# Patient Record
Sex: FEMALE | NOT HISPANIC OR LATINO | Employment: FULL TIME | URBAN - METROPOLITAN AREA
[De-identification: names, ages, dates, MRNs, and addresses within clinical notes are randomized per-mention and may not be internally consistent; named-entity substitution may affect disease eponyms.]

---

## 2024-07-10 ENCOUNTER — OFFICE VISIT (OUTPATIENT)
Dept: URGENT CARE | Facility: URGENT CARE | Age: 39
End: 2024-07-10
Payer: COMMERCIAL

## 2024-07-10 VITALS
SYSTOLIC BLOOD PRESSURE: 134 MMHG | OXYGEN SATURATION: 98 % | WEIGHT: 293 LBS | HEART RATE: 70 BPM | RESPIRATION RATE: 22 BRPM | TEMPERATURE: 98.9 F | DIASTOLIC BLOOD PRESSURE: 85 MMHG

## 2024-07-10 DIAGNOSIS — R60.0 LOWER EXTREMITY EDEMA: Primary | ICD-10-CM

## 2024-07-10 PROCEDURE — 99203 OFFICE O/P NEW LOW 30 MIN: CPT | Performed by: PHYSICIAN ASSISTANT

## 2024-07-10 RX ORDER — TRAZODONE HYDROCHLORIDE 100 MG/1
100-150 TABLET ORAL
COMMUNITY
Start: 2023-08-03 | End: 2024-07-11

## 2024-07-10 RX ORDER — ACETAMINOPHEN AND CODEINE PHOSPHATE 120; 12 MG/5ML; MG/5ML
1 SOLUTION ORAL DAILY
COMMUNITY
Start: 2024-03-15 | End: 2024-07-11

## 2024-07-10 RX ORDER — FUROSEMIDE 20 MG
20 TABLET ORAL DAILY
Qty: 20 TABLET | Refills: 0 | Status: SHIPPED | OUTPATIENT
Start: 2024-07-10 | End: 2024-07-17

## 2024-07-10 RX ORDER — GABAPENTIN 300 MG/1
300 CAPSULE ORAL
COMMUNITY
Start: 2024-03-23 | End: 2024-07-11

## 2024-07-10 RX ORDER — NAPROXEN 500 MG/1
500 TABLET ORAL
COMMUNITY
Start: 2023-03-23 | End: 2024-07-11

## 2024-07-10 RX ORDER — SUMATRIPTAN 25 MG/1
25 TABLET, FILM COATED ORAL
COMMUNITY
Start: 2023-03-23 | End: 2024-07-17

## 2024-07-10 RX ORDER — TRAZODONE HYDROCHLORIDE 150 MG/1
150 TABLET ORAL
COMMUNITY
Start: 2024-03-23 | End: 2024-07-11

## 2024-07-10 RX ORDER — FUROSEMIDE 20 MG
20 TABLET ORAL DAILY
COMMUNITY
Start: 2023-05-18 | End: 2024-07-17

## 2024-07-10 RX ORDER — OLANZAPINE 2.5 MG/1
2.5-5 TABLET, FILM COATED ORAL
COMMUNITY
Start: 2024-03-23 | End: 2024-07-11

## 2024-07-10 RX ORDER — LURASIDONE HYDROCHLORIDE 40 MG/1
40 TABLET, FILM COATED ORAL
COMMUNITY
Start: 2024-03-24 | End: 2024-07-11

## 2024-07-10 RX ORDER — MAGNESIUM OXIDE 400 MG/1
1 TABLET ORAL DAILY
COMMUNITY
Start: 2024-03-23 | End: 2024-07-11

## 2024-07-10 NOTE — PROGRESS NOTES
Assessment & Plan     Lower extremity edema  - furosemide (LASIX) 20 MG tablet; Take 1 tablet (20 mg) by mouth daily Until swelling reduces    Has an appointment scheduled to establish care tomorrow. Discussed that she should keep this appointment. All concerns may not be able to be addressed. Consider restarting birth control to regulate cycle. Reestablish with psychiatry.    Return in about 1 week (around 7/17/2024) for visit with primary care provider if not improving.     Mayte Crowder PA-C  [facility]    Subjective   Racquel Aguilar is a 38 year old who presents for the following health issues     Patient presents with:  Urgent Care  Vaginal Bleeding: Per patient states for the past couple of months she has been bleeding every 2 weeks states she feels tired , and angry   Swelling: Per patient states for the past couple of days her feet have been very swollen and gained 5lbs       HPI    Racquel presents to clinic today stating she has had rectal bleeding, vaginal bleeding, water retention and edema in lower extremities, is having a difficult time controlling her emotions and feels angry often and just feels unwell. She has a history of Bipolar I and cluster B personality disorder. She has not been taking her medications because she feels they're not helping.       Review of Systems   ROS negative except as stated above.      Objective    /85   Pulse 70   Temp 98.9  F (37.2  C) (Tympanic)   Resp 22   Wt 134.3 kg (296 lb)   SpO2 98%   Physical Exam   GENERAL: alert and no distress  EYES: Eyes grossly normal to inspection, PERRL and conjunctivae and sclerae normal  NECK: no adenopathy, no asymmetry, masses, or scars  RESP: lungs clear to auscultation - no rales, rhonchi or wheezes  CV: regular rate and rhythm, normal S1 S2, no S3 or S4, no murmur, click or rub, no peripheral edema  ABDOMEN: soft, nontender, no hepatosplenomegaly, no masses and bowel sounds normal  NEURO:  Normal strength and tone, mentation intact and speech normal  PSYCH: mentation appears normal, affect tearful, normal/bright    No results found for any visits on 07/10/24.

## 2024-07-10 NOTE — PATIENT INSTRUCTIONS
Start lasix for swelling in legs  Follow up with primary care provider tomorrow to address bleeding, health maintenance

## 2024-07-11 ENCOUNTER — OFFICE VISIT (OUTPATIENT)
Dept: FAMILY MEDICINE | Facility: CLINIC | Age: 39
End: 2024-07-11
Payer: COMMERCIAL

## 2024-07-11 ENCOUNTER — TELEPHONE (OUTPATIENT)
Dept: FAMILY MEDICINE | Facility: CLINIC | Age: 39
End: 2024-07-11

## 2024-07-11 VITALS
OXYGEN SATURATION: 98 % | HEIGHT: 65 IN | BODY MASS INDEX: 48.82 KG/M2 | RESPIRATION RATE: 20 BRPM | SYSTOLIC BLOOD PRESSURE: 134 MMHG | WEIGHT: 293 LBS | TEMPERATURE: 97.8 F | DIASTOLIC BLOOD PRESSURE: 84 MMHG | HEART RATE: 62 BPM

## 2024-07-11 DIAGNOSIS — Z13.1 SCREENING FOR DIABETES MELLITUS: ICD-10-CM

## 2024-07-11 DIAGNOSIS — F31.9 BIPOLAR 1 DISORDER (H): Primary | ICD-10-CM

## 2024-07-11 DIAGNOSIS — B96.89 BV (BACTERIAL VAGINOSIS): ICD-10-CM

## 2024-07-11 DIAGNOSIS — G47.33 OSA (OBSTRUCTIVE SLEEP APNEA): ICD-10-CM

## 2024-07-11 DIAGNOSIS — N93.9 ABNORMAL UTERINE BLEEDING: Primary | ICD-10-CM

## 2024-07-11 DIAGNOSIS — Z11.3 SCREEN FOR STD (SEXUALLY TRANSMITTED DISEASE): ICD-10-CM

## 2024-07-11 DIAGNOSIS — Z30.09 ENCOUNTER FOR COUNSELING REGARDING CONTRACEPTION: ICD-10-CM

## 2024-07-11 DIAGNOSIS — F43.10 PTSD (POST-TRAUMATIC STRESS DISORDER): ICD-10-CM

## 2024-07-11 DIAGNOSIS — F41.1 GAD (GENERALIZED ANXIETY DISORDER): ICD-10-CM

## 2024-07-11 DIAGNOSIS — N76.0 BV (BACTERIAL VAGINOSIS): ICD-10-CM

## 2024-07-11 DIAGNOSIS — F31.9 BIPOLAR 1 DISORDER (H): ICD-10-CM

## 2024-07-11 LAB
ALBUMIN SERPL BCG-MCNC: 3.8 G/DL (ref 3.5–5.2)
ALP SERPL-CCNC: 64 U/L (ref 40–150)
ALT SERPL W P-5'-P-CCNC: 25 U/L (ref 0–50)
ANION GAP SERPL CALCULATED.3IONS-SCNC: 6 MMOL/L (ref 7–15)
AST SERPL W P-5'-P-CCNC: 17 U/L (ref 0–45)
BILIRUB SERPL-MCNC: 0.3 MG/DL
BUN SERPL-MCNC: 9.3 MG/DL (ref 6–20)
C TRACH DNA SPEC QL PROBE+SIG AMP: NEGATIVE
CALCIUM SERPL-MCNC: 8.7 MG/DL (ref 8.6–10)
CHLORIDE SERPL-SCNC: 106 MMOL/L (ref 98–107)
CLUE CELLS: PRESENT
CREAT SERPL-MCNC: 0.59 MG/DL (ref 0.51–0.95)
DEPRECATED HCO3 PLAS-SCNC: 28 MMOL/L (ref 22–29)
EGFRCR SERPLBLD CKD-EPI 2021: >90 ML/MIN/1.73M2
ERYTHROCYTE [DISTWIDTH] IN BLOOD BY AUTOMATED COUNT: 13 % (ref 10–15)
FERRITIN SERPL-MCNC: 44 NG/ML (ref 6–175)
GLUCOSE SERPL-MCNC: 93 MG/DL (ref 70–99)
HBA1C MFR BLD: 5.2 % (ref 0–5.6)
HCG UR QL: NEGATIVE
HCT VFR BLD AUTO: 37.5 % (ref 35–47)
HCV AB SERPL QL IA: NONREACTIVE
HGB BLD-MCNC: 12.3 G/DL (ref 11.7–15.7)
HIV 1+2 AB+HIV1 P24 AG SERPL QL IA: NONREACTIVE
HOLD SPECIMEN: NORMAL
IRON BINDING CAPACITY (ROCHE): 293 UG/DL (ref 240–430)
IRON SATN MFR SERPL: 10 % (ref 15–46)
IRON SERPL-MCNC: 30 UG/DL (ref 37–145)
MCH RBC QN AUTO: 29.4 PG (ref 26.5–33)
MCHC RBC AUTO-ENTMCNC: 32.8 G/DL (ref 31.5–36.5)
MCV RBC AUTO: 90 FL (ref 78–100)
N GONORRHOEA DNA SPEC QL NAA+PROBE: NEGATIVE
PLATELET # BLD AUTO: 303 10E3/UL (ref 150–450)
POTASSIUM SERPL-SCNC: 4 MMOL/L (ref 3.4–5.3)
PROT SERPL-MCNC: 7.1 G/DL (ref 6.4–8.3)
RBC # BLD AUTO: 4.19 10E6/UL (ref 3.8–5.2)
SODIUM SERPL-SCNC: 140 MMOL/L (ref 135–145)
T PALLIDUM AB SER QL: NONREACTIVE
TRICHOMONAS, WET PREP: ABNORMAL
TSH SERPL DL<=0.005 MIU/L-ACNC: 0.74 UIU/ML (ref 0.3–4.2)
WBC # BLD AUTO: 9.7 10E3/UL (ref 4–11)
WBC'S/HIGH POWER FIELD, WET PREP: ABNORMAL
YEAST, WET PREP: ABNORMAL

## 2024-07-11 PROCEDURE — 99214 OFFICE O/P EST MOD 30 MIN: CPT | Mod: 25 | Performed by: PHYSICIAN ASSISTANT

## 2024-07-11 PROCEDURE — 87491 CHLMYD TRACH DNA AMP PROBE: CPT | Performed by: PHYSICIAN ASSISTANT

## 2024-07-11 PROCEDURE — 82728 ASSAY OF FERRITIN: CPT | Performed by: PHYSICIAN ASSISTANT

## 2024-07-11 PROCEDURE — 80053 COMPREHEN METABOLIC PANEL: CPT | Performed by: PHYSICIAN ASSISTANT

## 2024-07-11 PROCEDURE — 87210 SMEAR WET MOUNT SALINE/INK: CPT | Performed by: PHYSICIAN ASSISTANT

## 2024-07-11 PROCEDURE — 87389 HIV-1 AG W/HIV-1&-2 AB AG IA: CPT | Performed by: PHYSICIAN ASSISTANT

## 2024-07-11 PROCEDURE — 81025 URINE PREGNANCY TEST: CPT | Performed by: PHYSICIAN ASSISTANT

## 2024-07-11 PROCEDURE — 96372 THER/PROPH/DIAG INJ SC/IM: CPT | Performed by: PHYSICIAN ASSISTANT

## 2024-07-11 PROCEDURE — 87591 N.GONORRHOEAE DNA AMP PROB: CPT | Performed by: PHYSICIAN ASSISTANT

## 2024-07-11 PROCEDURE — 36415 COLL VENOUS BLD VENIPUNCTURE: CPT | Performed by: PHYSICIAN ASSISTANT

## 2024-07-11 PROCEDURE — 86780 TREPONEMA PALLIDUM: CPT | Performed by: PHYSICIAN ASSISTANT

## 2024-07-11 PROCEDURE — 83036 HEMOGLOBIN GLYCOSYLATED A1C: CPT | Performed by: PHYSICIAN ASSISTANT

## 2024-07-11 PROCEDURE — 83550 IRON BINDING TEST: CPT | Performed by: PHYSICIAN ASSISTANT

## 2024-07-11 PROCEDURE — 84443 ASSAY THYROID STIM HORMONE: CPT | Performed by: PHYSICIAN ASSISTANT

## 2024-07-11 PROCEDURE — 86803 HEPATITIS C AB TEST: CPT | Performed by: PHYSICIAN ASSISTANT

## 2024-07-11 PROCEDURE — 85027 COMPLETE CBC AUTOMATED: CPT | Performed by: PHYSICIAN ASSISTANT

## 2024-07-11 PROCEDURE — 83540 ASSAY OF IRON: CPT | Performed by: PHYSICIAN ASSISTANT

## 2024-07-11 RX ORDER — MEDROXYPROGESTERONE ACETATE 150 MG/ML
150 INJECTION, SUSPENSION INTRAMUSCULAR
Status: ACTIVE | OUTPATIENT
Start: 2024-07-11 | End: 2025-07-06

## 2024-07-11 RX ORDER — METRONIDAZOLE 500 MG/1
500 TABLET ORAL 2 TIMES DAILY
Qty: 14 TABLET | Refills: 0 | Status: SHIPPED | OUTPATIENT
Start: 2024-07-11 | End: 2024-07-17

## 2024-07-11 RX ORDER — OLANZAPINE 2.5 MG/1
2.5-5 TABLET, FILM COATED ORAL DAILY
Qty: 60 TABLET | Refills: 0 | Status: SHIPPED | OUTPATIENT
Start: 2024-07-11 | End: 2024-07-11

## 2024-07-11 RX ORDER — OLANZAPINE 5 MG/1
2.5-5 TABLET ORAL DAILY
Qty: 30 TABLET | Refills: 0 | Status: SHIPPED | OUTPATIENT
Start: 2024-07-11 | End: 2024-09-12

## 2024-07-11 RX ADMIN — MEDROXYPROGESTERONE ACETATE 150 MG: 150 INJECTION, SUSPENSION INTRAMUSCULAR at 09:43

## 2024-07-11 ASSESSMENT — PAIN SCALES - GENERAL: PAINLEVEL: EXTREME PAIN (8)

## 2024-07-11 NOTE — TELEPHONE ENCOUNTER
RN called and relayed provider message     Patient verbalized understanding and in agreement with plan of care.     Ammy Mann RN

## 2024-07-11 NOTE — TELEPHONE ENCOUNTER
Please call pt with the following message:    Vaginal swab showing bacterial vaginosis. This is not an STD. I sent a prescription for oral metronidazole to the pharmacy- take twice daily for 7 days. Do not drink alcohol while on this medication.     She does not have anemia from the bleeding. Her hemoglobin is normal and her ferritin, the iron stores are normal.     She does not have diabetes. The A1c test was normal.     Other labs in process.  (If pt does not have MyChart, please mail a letter with results and my comments.)  KRYS Muniz RN  [facility] - Registered Nurse  Clinic Triage [place]   July 11, 2024

## 2024-07-11 NOTE — PROGRESS NOTES
Assessment & Plan     Abnormal uterine bleeding  Encounter for counseling regarding contraception  Currently on progestin-only minipill and bleeding every 2 weeks. Can be heavy and clotty.   She declines exam today due to the bleeding  Discussed possible causes of the bleeding: irregular bleeding due to progestin only contraception, structural uterine causes, STI, pregnancy, thyroid abnormalities etc.   Plan for pelvic US  Urine pregnancy is negative  Chlamydia and gonorrhea screening- sounds low risk, , condom use.   TSH done in March was normal but will repeat.  She will come back for a pap.  Screen for anemia due to bleeding.   She needs reliable contraception. Has tried many contraceptive options. Avoiding estrogen with smoking/vaping , migraines and over age 35. She did not tolerate paraguard but would consider mirena- wants to think on it. She does NOT want to do nexplanon. Her pref right now is depo, as that has been effective for her in the past as contraception, tolerates well and historically has not had bleeding with it. Given today.   Her spouse will not consider a vasectomy  She may be interested in a tubal. Can ref to GYN if needed   Recheck in 1 mo   - US Pelvic Complete with Transvaginal; Future  - Comprehensive metabolic panel; Future  - TSH with free T4 reflex; Future  - Chlamydia trachomatis/Neisseria gonorrhoeae by PCR; Future  - CBC with Platelets and Reflex to Iron Studies; Future  - Wet prep - lab collect; Future  - HCG Qual, Urine ([id number]); Future  - medroxyPROGESTERone (DEPO-PROVERA) injection 150 mg  - Chlamydia trachomatis/Neisseria gonorrhoeae by PCR  - Wet prep - lab collect  - HCG Qual, Urine ([id number])  - Comprehensive metabolic panel  - TSH with free T4 reflex  - CBC with Platelets and Reflex to Iron Studies  - Iron & Iron Binding Capacity  - Ferritin    Bipolar 1 disorder (H)  PTSD (post-traumatic stress disorder)  GARRETT (generalized anxiety disorder)  Her last psychiatrist  "was summer 2023. Still takes zyprexa prn, but otherwise she has been off all her medications due to side effects or ineffectiveness I did refill the zyprexa.   Referred to establish with a psychiatrist for long term management- complex hx and many prior med trials.   Referred to establish with a therapist as well.   - Adult Mental Health  Referral; Future  - OLANZapine (ZYPREXA) 2.5 MG tablet; Take 1-2 tablets (2.5-5 mg) by mouth daily  - Adult Mental Health  Referral; Future    AISLINN (obstructive sleep apnea)  Was diagnosed in [place] a few years ago but never got a cpap. Referral to sleep med to start treating.   - Adult Sleep Eval & Management  Referral; Future    Screen for STD (sexually transmitted disease)  Safe sex practices discussed and advised. STD screening as below.   - Chlamydia trachomatis/Neisseria gonorrhoeae by PCR; Future  - Treponema Abs w Reflex to RPR and Titer; Future  - Hepatitis C Screen Reflex to HCV RNA Quant and Genotype; Future  - HIV Antigen Antibody Combo Cascade; Future  - Chlamydia trachomatis/Neisseria gonorrhoeae by PCR  - Treponema Abs w Reflex to RPR and Titer  - Hepatitis C Screen Reflex to HCV RNA Quant and Genotype  - HIV Antigen Antibody Combo Cascade    Screening for diabetes mellitus  Nonfasting on atypical antipsychotic and has obesity  - Hemoglobin A1c; Future  - Hemoglobin A1c    BV (bacterial vaginosis)  Treat per below   - metroNIDAZOLE (FLAGYL) 500 MG tablet; Take 1 tablet (500 mg) by mouth 2 times daily for 7 days  Dispense: 14 tablet; Refill: 0        BMI  Estimated body mass index is 48.76 kg/m  as calculated from the following:    Height as of this encounter: 1.651 m (5' 5\").    Weight as of this encounter: 132.9 kg (293 lb).   Weight management plan: Discussed healthy diet and exercise guidelines      Follow Up: see above. Additionally patient was instructed to contact clinic for worsening symptoms, non-improvement in time frame " discussed, and for questions regarding treatment plan.   For virtual visits, the patient was advised to be seen for in person evaluation if symptoms or condition are worsening or non-improvement as expected.   Racquel Armas PA-C    Subjective   Racquel is a 38 year old, presenting for the following health issues:  Menstral Concerns , Referral, and Urgent Care Follow Up        7/11/2024     8:03 AM   Additional Questions   Roomed by Chanell Lewis CMA   Accompanied by self         7/11/2024     8:03 AM   Patient Reported Additional Medications   Patient reports taking the following new medications none     History of Present Illness       Reason for visit:  I am getting my period heavy every two weeks need referal and talk about general health  Symptom onset:  More than a month    She eats 0-1 servings of fruits and vegetables daily.She consumes 0 sweetened beverage(s) daily.She exercises with enough effort to increase her heart rate 30 to 60 minutes per day.  She exercises with enough effort to increase her heart rate 3 or less days per week.   She is taking medications regularly.     ED/UC Followup:    Facility:  [facility] [place]  Date of visit: 7/10/2024  Reason for visit: Vaginal bleeding and lower leg swelling  Current Status: menstrual cycles are about every 2 weeks and are extremely heavy with clots, swelling and bloating so bad she has gained weight back that she has lost, making it hard to work and do things at home    Uterine bleeding   Went to urgent care yesterday because for the last 2 months I have been bleeding every 2 weeks. My moods are psycho and I am retaining fluids like crazy. She is on the norethindrone (progesterone only) OCP for the lat 6 months. Taking at same time daily. Bleeding has been heavy, crampy, and clotty. Has been on the norethindrone on and off for many years and has never had bleeding like this.   She is . Uses condoms with spouse. I do want to do STD  "testing to verify.   Does not think likely pregnant.   No vaginal discharge other than bleeding but thinks would be hard to tell with the bleeding.   In urgent care yesterday told her to quit the norethindrone.  Used to be on depo and that was very effective for her. Never had bleeding. Used depo on and off.   Tried paraguard IUD and it was painful and didn't tolerate it.   Tried patch (wouldn't stay on me) and nuva ring (hard time getting it in and out) over the years. Vape a lot. Hx migraines no aura.   Has 2 kids ages 15 and 21yo. Done having kids. Spouse is not open to doing vasectomy.   Also has Premenstrual dysphoria and hormones help with that.     Mental health-  Bipolar 1, PTSD, anxiety, cluster B personality.   Last psychiatrist was Dr.Brian Rodriguez. Last saw summer 2023 (1 yr ago).   Med side effects or less effective.   Quit all her meds- 1 yr ago. Except zyprexa.   Takes zyprexa prn. \"When I need it\".   I never sleep well- seroquel worked best but psychiatrist wanted sleep apnea treated first.   Does not feel she is manic now. No SI.   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       Snoring and sleep apnea- diagnosed with sleep apnea 3-4 years ago. Never got the CPAP.   I never sleep well.           Review of Systems  Constitutional, HEENT, cardiovascular, pulmonary, gi and gu systems are negative, except as otherwise noted.      Objective    /84   Pulse 62   Temp 97.8  F (36.6  C) (Temporal)   Resp 20   Ht 1.651 m (5' 5\")   Wt 132.9 kg (293 lb)   LMP 07/08/2024 (Exact Date)   SpO2 98%   Breastfeeding No   BMI 48.76 kg/m    Body mass index is 48.76 kg/m .  Physical Exam   GENERAL: alert and no distress  EYES: Eyes grossly normal to inspection, PERRL and conjunctivae and sclerae normal  HENT: ear canals and TM's normal, nose and mouth without ulcers or lesions  NECK: no adenopathy, no asymmetry, masses, or scars  RESP: lungs clear to auscultation - no rales, " rhonchi or wheezes  CV: regular rate and rhythm, normal S1 S2, no S3 or S4, no murmur, click or rub, no peripheral edema  ABDOMEN: soft, nontender, no hepatosplenomegaly, no masses and bowel sounds normal   (female): pt declines today    MS: no gross musculoskeletal defects noted, no edema  NEURO: Normal strength and tone, mentation intact and speech normal  PSYCH: mentation appears normal, affect normal/bright    Results for orders placed or performed in visit on 07/11/24   HCG Qual, Urine ([id number])     Status: Normal   Result Value Ref Range    hCG Urine Qualitative Negative Negative   Hemoglobin A1c     Status: Normal   Result Value Ref Range    Hemoglobin A1C 5.2 0.0 - 5.6 %   CBC with Platelets and Reflex to Iron Studies     Status: Normal   Result Value Ref Range    WBC Count 9.7 4.0 - 11.0 10e3/uL    RBC Count 4.19 3.80 - 5.20 10e6/uL    Hemoglobin 12.3 11.7 - 15.7 g/dL    Hematocrit 37.5 35.0 - 47.0 %    MCV 90 78 - 100 fL    MCH 29.4 26.5 - 33.0 pg    MCHC 32.8 31.5 - 36.5 g/dL    RDW 13.0 10.0 - 15.0 %    Platelet Count 303 150 - 450 10e3/uL   Extra Green Top (Lithium Heparin) Tube     Status: None   Result Value Ref Range    Hold Specimen [facility]    Wet prep - lab collect     Status: Abnormal    Specimen: Vagina; Swab   Result Value Ref Range    Trichomonas Absent Absent    Yeast Absent Absent    Clue Cells Present (A) Absent    WBCs/high power field None None   CBC with Platelets and Reflex to Iron Studies     Status: None    Narrative    The following orders were created for panel order CBC with Platelets and Reflex to Iron Studies.  Procedure                               Abnormality         Status                     ---------                               -----------         ------                     CBC with Platelets and R...[[id number]]  Normal              Final result               Extra Green Top (Lithium...[[id number]]                      Final result                 Please view results for  these tests on the individual orders.           Signed Electronically by: Racquel Armas PA-C

## 2024-07-11 NOTE — TELEPHONE ENCOUNTER
Outpatient Medication Detail     Disp Refills Start End STEPH   OLANZapine (ZYPREXA) 2.5 MG tablet 60 tablet 0 7/11/2024 -- No   Sig - Route: Take 1-2 tablets (2.5-5 mg) by mouth daily - Oral   Sent to pharmacy as: OLANZapine 2.5 MG Oral Tablet (zyPREXA)   Class: E-Prescribe   Order: [id number]   E-Prescribing Status: Receipt confirmed by pharmacy (7/11/2024  8:45 AM CDT)     Printout Tracking    External Result Report     Pharmacy    [facility] [address] [place] [id number] [facility]         Pharmacy message:   ins wants to only pay for 1 TAB daily.  Please send in for the 5mg at one - half to one TAB daily. Thanks.

## 2024-07-11 NOTE — PATIENT INSTRUCTIONS
Plan for pelvic US - to look for structural causes of the bleeding.  Irregular bleeding can be part of progesterone-only contraception.   Labs today  We talked about IUD, nexplanon, and depo.   Depo given today. When due again can do Mirena IUD instead   Std screening    Please establish with a psychiatrist and a therapist    See me back in 1-2 months     To schedule your Imaging Test or Specialty Referral please call:  [facility]   Radiology (imaging tests:  mammogram, ultrasound, CT, MRI): [phone]  Speciality consultation [phone]  [address]  [facility] [address]    [facility]  Radiology (imaging): [phone]  Specialty consultation [phone]  Colonoscopy [phone]  [address] [address]     Other Mammogram Options:  [place] Locations:  [facility], [facility]*, [place]* ([facility]), [facility]*-- Call to schedule [phone]  [place] Locations: [place], [place], [facility], [place]*, [facility] [place], [facility] [place]*, [facility] [place]*, [place], [place], [place]-- Call to schedule [phone]   [facility] Locations: [place]* and [facility]-- Call to schedule [phone]  *locations marked with a star have 3D mammography available

## 2024-07-11 NOTE — NURSING NOTE
Clinic Administered Medication Documentation        Patient was given Depo Provera. Prior to medication administration, verified patient's identity using patient s name and date of birth. Please see MAR and medication order for additional information. Patient instructed to remain in clinic for 15 minutes and report any adverse reaction to staff immediately.    Vial/Syringe: Single dose vial. Was entire vial of medication used? Yes    NEXT INJECTION DUE: 9/26/24 - 10/24/24    Chanell Lewis CMA ([facility])

## 2024-07-15 ENCOUNTER — NURSE TRIAGE (OUTPATIENT)
Dept: FAMILY MEDICINE | Facility: CLINIC | Age: 39
End: 2024-07-15
Payer: COMMERCIAL

## 2024-07-15 NOTE — TELEPHONE ENCOUNTER
"Please triage per appt note. \"Blood in stool\" states has been happening for \"a while\" appt made with KP for 7/17/24.   "

## 2024-07-15 NOTE — TELEPHONE ENCOUNTER
"AMERICA has appt. No red flags  Nurse Triage SBAR    Is this a 2nd Level Triage? YES, LICENSED PRACTITIONER REVIEW IS REQUIRED    Situation: 8 monthd patient has had blood around stool and small nickel sized blood clots come randomly through rectum.   Backround: States she has been seen in ED several times for both uterine and rectum bleeding.    Assessment: Patient has small stringy bright red clots nickel sized with stools from rectum, abdominal pain at times with pressure. Feels fatigued constantly. \"Feels like I need to poop really bad and then a small clot comes out.\" Denies fever, chills, N&V, CP, HA, SOB.   Protocol Recommended Disposition:   See in Office Today, See More Appropriate Protocol  Appointments in Next Year      Jul 17, 2024 2:00 PM  (Arrive by 1:40 PM)  Provider Visit with Racquel Armas PA-C  [facility] [place] ([facility]) [phone]   Declined appointment today ot tomorrow.     Recommendation: Worsening symptoms to be seen for urgent evaluation in ED. Patient verbalized understanding and all questions answered.   Routed to provider    Does the patient meet one of the following criteria for ADS visit consideration? 16+ years old, with an [facility] PCP     Ninoska Jay RN  [facility] - Registered Nurse  Clinic Triage [place]   July 15, 2024    Reason for Disposition   Blood in or on bowel movement is main symptom   Patient wants to be seen    Additional Information   Negative: Passed out (i.e., fainted, collapsed and was not responding)   Negative: Shock suspected (e.g., cold/pale/clammy skin, too weak to stand, low BP, rapid pulse)   Negative: Vomiting red blood or black (coffee ground) material   Negative: Sounds like a life-threatening emergency to the triager   Negative: Diarrhea is main symptom   Negative: Rectal symptoms   Negative: SEVERE rectal bleeding (large blood clots; constant or on and off bleeding)   Negative: SEVERE dizziness (e.g., unable " to stand, requires support to walk, feels like passing out now)   Negative: MODERATE rectal bleeding (small blood clots, passing blood without stool, or toilet water turns red) more than once a day   Negative: Bloody, black, or tarry bowel movements  (Exception: Chronic-unchanged black-grey bowel movements and is taking iron pills or Pepto-Bismol.)   Negative: High-risk adult (e.g., prior surgery on aorta, abdominal aortic aneurysm)   Negative: Rectal foreign body (inserted or swallowed)   Negative: SEVERE abdominal pain (e.g., excruciating)   Negative: Constant abdominal pain lasting > 2 hours   Negative: Pale skin (pallor) of new-onset or worsening   Negative: Patient sounds very sick or weak to the triager   Negative: Sounds like a life-threatening emergency to the triager   Negative: Diarrhea is main symptom   Negative: Constipation is main symptom (e.g., pain or discomfort caused by passage of hard BMs)   Negative: MODERATE rectal bleeding (small blood clots, passing blood without stool, or toilet water turns red)   Negative: Taking Coumadin (warfarin) or other strong blood thinner, or known bleeding disorder (e.g., thrombocytopenia)   Negative: Colonoscopy in past 72 hours   Negative: Known cirrhosis of the liver (or history of liver failure or ascites)    Protocols used: Rectal Symptoms-A-OH, Rectal Bleeding-A-OH

## 2024-07-17 ENCOUNTER — HOSPITAL ENCOUNTER (EMERGENCY)
Facility: CLINIC | Age: 39
Discharge: HOME | End: 2024-07-17
Attending: EMERGENCY MEDICINE | Admitting: EMERGENCY MEDICINE
Payer: COMMERCIAL

## 2024-07-17 ENCOUNTER — OFFICE VISIT (OUTPATIENT)
Dept: FAMILY MEDICINE | Facility: CLINIC | Age: 39
End: 2024-07-17
Payer: COMMERCIAL

## 2024-07-17 VITALS
TEMPERATURE: 97.7 F | HEART RATE: 60 BPM | WEIGHT: 290.9 LBS | RESPIRATION RATE: 18 BRPM | OXYGEN SATURATION: 98 % | SYSTOLIC BLOOD PRESSURE: 130 MMHG | DIASTOLIC BLOOD PRESSURE: 77 MMHG | HEIGHT: 65 IN | BODY MASS INDEX: 48.47 KG/M2

## 2024-07-17 VITALS
OXYGEN SATURATION: 97 % | HEART RATE: 72 BPM | SYSTOLIC BLOOD PRESSURE: 155 MMHG | BODY MASS INDEX: 48.28 KG/M2 | TEMPERATURE: 97.8 F | DIASTOLIC BLOOD PRESSURE: 79 MMHG | HEIGHT: 65 IN | WEIGHT: 289.8 LBS

## 2024-07-17 DIAGNOSIS — F31.9 BIPOLAR 1 DISORDER (H): Primary | ICD-10-CM

## 2024-07-17 DIAGNOSIS — F43.10 PTSD (POST-TRAUMATIC STRESS DISORDER): ICD-10-CM

## 2024-07-17 DIAGNOSIS — K62.5 RECTAL BLEEDING: ICD-10-CM

## 2024-07-17 DIAGNOSIS — F41.9 ANXIETY: ICD-10-CM

## 2024-07-17 DIAGNOSIS — F41.1 GAD (GENERALIZED ANXIETY DISORDER): ICD-10-CM

## 2024-07-17 DIAGNOSIS — F31.62 BIPOLAR DISORDER, CURRENT EPISODE MIXED, MODERATE (H): ICD-10-CM

## 2024-07-17 PROCEDURE — 99214 OFFICE O/P EST MOD 30 MIN: CPT | Performed by: PHYSICIAN ASSISTANT

## 2024-07-17 PROCEDURE — 99283 EMERGENCY DEPT VISIT LOW MDM: CPT

## 2024-07-17 PROCEDURE — 99204 OFFICE O/P NEW MOD 45 MIN: CPT | Performed by: NURSE PRACTITIONER

## 2024-07-17 RX ORDER — SUMATRIPTAN 25 MG/1
25 TABLET, FILM COATED ORAL
Status: CANCELLED | OUTPATIENT
Start: 2024-07-17

## 2024-07-17 RX ORDER — QUETIAPINE 150 MG/1
150 TABLET, FILM COATED, EXTENDED RELEASE ORAL AT BEDTIME
Qty: 30 TABLET | Refills: 0 | Status: SHIPPED | OUTPATIENT
Start: 2024-07-17

## 2024-07-17 ASSESSMENT — ENCOUNTER SYMPTOMS: HEMATOCHEZIA: 1

## 2024-07-17 ASSESSMENT — ACTIVITIES OF DAILY LIVING (ADL)
ADLS_ACUITY_SCORE: 35

## 2024-07-17 ASSESSMENT — COLUMBIA-SUICIDE SEVERITY RATING SCALE - C-SSRS
1. IN THE PAST MONTH, HAVE YOU WISHED YOU WERE DEAD OR WISHED YOU COULD GO TO SLEEP AND NOT WAKE UP?: YES
4. HAVE YOU HAD THESE THOUGHTS AND HAD SOME INTENTION OF ACTING ON THEM?: YES
3. HAVE YOU BEEN THINKING ABOUT HOW YOU MIGHT KILL YOURSELF?: NO
5. HAVE YOU STARTED TO WORK OUT OR WORKED OUT THE DETAILS OF HOW TO KILL YOURSELF? DO YOU INTEND TO CARRY OUT THIS PLAN?: NO
6. HAVE YOU EVER DONE ANYTHING, STARTED TO DO ANYTHING, OR PREPARED TO DO ANYTHING TO END YOUR LIFE?: NO
2. HAVE YOU ACTUALLY HAD ANY THOUGHTS OF KILLING YOURSELF IN THE PAST MONTH?: YES

## 2024-07-17 ASSESSMENT — PAIN SCALES - GENERAL: PAINLEVEL: MODERATE PAIN (4)

## 2024-07-17 NOTE — ED PROVIDER NOTES
"  Emergency Department Note      History of Present Illness     Chief Complaint   Depression      HPI   Racquel Aguilar is a 38 year old female with a history of bipolar, PTSD, depression, anxiety who presents to the ED for evaluation of abdominal pain and a psychiatric evaluation. The patient states she has been dealing with bipolar 1 disorder for many years and is currently dealing with a severe depressive episode. Denies current suicidal ideation but feels her depression is getting severe enough that she will be suicidal soon. Expresses she is not able to work due to the depression and is having difficulties with her . States her PCP recommended she be seen here in [facility]. States she stopped her regulatory medications about 1 year ago. States she does not tolerate lithium medication well. Reports some abdominal pain but does not feel it is of concern. Denies other medical concerns. Denies homicidal ideation.     Independent Historian   None    Review of External Notes   I reviewed her office visit from earlier today who recommended she come here for her mental health issues.    Past Medical History     Medical History and Problem List   No past medical history on file.    Medications   furosemide (LASIX) 20 MG tablet  furosemide (LASIX) 20 MG tablet  metroNIDAZOLE (FLAGYL) 500 MG tablet  OLANZapine (ZYPREXA) 5 MG tablet  SUMAtriptan (IMITREX) 25 MG tablet        Surgical History   No past surgical history on file.    Physical Exam     Patient Vitals for the past 24 hrs:   BP Temp Temp src Pulse Resp SpO2 Height Weight   07/17/24 1647 -- -- -- -- -- 98 % -- --   07/17/24 1637 121/61 97.1  F (36.2  C) Tympanic 60 19 -- 1.651 m (5' 5\") 131.1 kg (289 lb)     Physical Exam  Constitutional: Vital signs reviewed.  Pleasant.  HEENT: Moist mucous membranes  Cardiovascular: Regular rate and rhythm  Pulmonary/Chest: Breathing comfortably on room air.  No audible wheezing  Musculoskeletal/Extremities: No bony " deformities.  Moves all 4 extremities without difficulty.  Neurological: Alert.  No focal deficits.  Endo: No pitting edema  Skin: No visible rash.  Psychiatric: Pleasant. Anxious and tearful. Admits to severe depression and difficulty with daily tasks including work.    Diagnostics     Lab Results   Labs Ordered and Resulted from Time of ED Arrival to Time of ED Departure - No data to display    Imaging   No orders to display       ED Course      Medications Administered   Medications - No data to display    Procedures   Procedures     Discussion of Management       ED Course   ED Course as of 07/17/24 1724   Wed Jul 17, 2024   1641 I obtained history and performed a physical exam as noted above.        Optional/Additional Documentation  Medication noncompliance    Medical Decision Making / Diagnosis     CMS Diagnoses: None    MIPS       None    MDM   Racquel Aguilar is a 38 year old female who presents stating she is any bipolar crisis.  She admits that she took herself off all of her meds 1 year ago and she no longer could function in her daily life.  She is very depressed, she cries all the time, she is having hard time even holding a job.  She was sent here for evaluation by her primary care provider.  She was also concerned with some bright red blood per rectum which she brought appear today briefly however she has been seen for this and has been on for several months and she says right now she is to focus on her mental health.  Her vitals here are unremarkable.  She is very anxious and tearful.  I do think she benefit very well from being seen by empath and getting back on her medications.  She is comfortable this plan and will be brought over there once they have a bed.    Disposition   The patient was transferred to [facility].     Diagnosis     ICD-10-CM    1. Depression with anxiety  F41.8       2. Bipolar affective disorder, currently depressed, moderate (H)  F31.32            Discharge Medications   New  Prescriptions    No medications on file         Scribe Disclosure:  I, Darlene Verasar, am serving as a scribe at 4:40 PM on 7/17/2024 to document services personally performed by Eddi Lundberg MD based on my observations and the provider's statements to me.        Eddi Lundberg MD  07/17/24 1837
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
## 2024-07-17 NOTE — ED TRIAGE NOTES
Pt presents with suicidal ideation and increased anger. Pt reports she takes Zyprexa to break the anger spiral, but has not been effective recently.      Triage Assessment (Adult)       Row Name 07/17/24 5948          Triage Assessment    Airway WDL WDL        Cognitive/Neuro/Behavioral WDL    Cognitive/Neuro/Behavioral WDL mood/behavior     Mood/Behavior anxious;sad

## 2024-07-17 NOTE — ED NOTES
Patient brought over from waiting area to [facility]. States she has been increasingly irate and short tempered with her  and children. Takes 2.5 mg zyprexa bid. Works as a CNA.currently, just started a new job after moving here from [place]. Orientation to unit given. Patient pleasant and cooperative.

## 2024-07-17 NOTE — PATIENT INSTRUCTIONS
Go to the ER at [facility] .     When you check in at the desk, tell them you want to be seen at the [facility] .  This is emergency mental health services  [address] [place], [address]

## 2024-07-18 ENCOUNTER — TELEPHONE (OUTPATIENT)
Dept: BEHAVIORAL HEALTH | Facility: CLINIC | Age: 39
End: 2024-07-18
Payer: COMMERCIAL

## 2024-07-18 ENCOUNTER — PATIENT OUTREACH (OUTPATIENT)
Dept: FAMILY MEDICINE | Facility: CLINIC | Age: 39
End: 2024-07-18
Payer: COMMERCIAL

## 2024-07-18 NOTE — ED PROVIDER NOTES
[facility] - Psychiatric Consultation  [facility] Emergency Department    Racquel Aguilar MRN: [id number]   Age: 38 year old YOB: 1985     History     Chief Complaint   Patient presents with    Depression     HPI  Racquel Aguilar is a 38 year old female with history notable for bipolar disorder, PTSD, anxiety, and cluster B traits. Patient presented to the emergency department for evaluation of increased mood lability in the context of jimmy symptoms. Patient was medically evaluated in the emergency department and determined to be medically stable for transfer to [facility] for further psychiatric assessment. Patient is nearing 3.5 hours in emergency care.     Here at [facility], patient describes recent psychiatric decompensation over the last month, worsening in the last week or so. She reports that she has become increasingly emotionally labile, with increased irritability, anger, and lashing out at others. She notes decreased need for sleep, reporting some nights only sleeping for about 2 hours per night recently. She endorses experiencing a lot of energy during the day, despite very little sleep. She reports that when she is decompensated, she will begin to experiencing increased suicidal thinking. She notes that she feels much better after arriving here and talking about her symptoms, currently denies any active thoughts, plans, or intent to harm herself. She reports that her dad has been telling her she appears manic, but she has not been listening to him. She is worried about losing her job and is seeking psychiatric stability. She will often go without eating during periods of jimmy, finds herself increasingly distracted, unable to focus on the task at hand. She presents with rapid, pressured speech. She is tangential in thought. Endorses having trialed a variety of medications in the past, including lamotrigine, lithium, quetiapine, Abilify, lurasidone, sertraline. She reports that sertraline has  "caused increased energy in the past. She recalls having done well on Abilify Maintenna injections, but stopped these early last year. She reports she has also done well with quetiapine historically and agrees to start with this. She denies any symptoms of psychosis. No evidence for homicidal thinking. She is seeking discharge home this evening.     Past Medical History  No past medical history on file.  No past surgical history on file.  OLANZapine (ZYPREXA) 5 MG tablet  QUEtiapine (SEROQUEL XR) 150 MG TB24 24 hr tablet      Allergies   Allergen Reactions    Dust Mite Extract Shortness Of Breath    Ibuprofen Anaphylaxis    Seafood Anaphylaxis     Shellfish    Lithium Other (See Comments)     Psychiatric, urinary, dermatologic side effects    Sulfa Antibiotics Hives and Other (See Comments)     Comment: Hives/Skin Rash, Description:     Family History  Family History   Problem Relation Age of Onset    Hypertension Mother     Hyperlipidemia Mother     Coronary Artery Disease Father      Social History   Social History     Tobacco Use    Smoking status: Never     Passive exposure: Never    Smokeless tobacco: Never   Vaping Use    Vaping status: Every Day    Substances: Nicotine   Substance Use Topics    Alcohol use: Yes     Comment: 1 per month    Drug use: Yes     Frequency: 14.0 times per week     Types: Marijuana     Comment: 2x per day          Review of Systems  A medically appropriate review of systems was performed with pertinent positives and negatives noted in the HPI, and all other systems negative.    Physical Examination   BP: 121/61  Pulse: 60  Temp: 97.1  F (36.2  C)  Resp: 19  Height: 165.1 cm (5' 5\")  Weight: 131.1 kg (289 lb)  SpO2: 98 %    Physical Exam  General: Appears stated age.   Neuro: Alert and fully oriented. Extremities appear to demonstrate normal strength on visual inspection.   Integumentary/Skin: no rash visualized, normal color    Psychiatric Examination   Appearance: awake, alert, " "adequately groomed, appeared as age stated, and casually dressed  Attitude:  cooperative  Eye Contact:  good  Mood: \"manic,\" \"better now\"  Affect:  mood congruent and intensity is heightened  Speech:  pressured speech and rambling  Psychomotor Behavior:  no evidence of tardive dyskinesia, dystonia, or tics  Thought Process:  tangental and circumstantial  Associations:  no loose associations  Thought Content:  no evidence of suicidal ideation or homicidal ideation, no evidence of psychotic thought, no auditory hallucinations present, and no visual hallucinations present  Insight:  fair  Judgement:  fair  Oriented to:  time, person, and place  Attention Span and Concentration:  fair  Recent and Remote Memory:  fair  Language: able to name/identify objects without impairment  Fund of Knowledge: intact with awareness of current and past events    ED Course     ED Course as of 07/17/24 1934 Wed Jul 17, 2024   1641 I obtained history and performed a physical exam as noted above.        Labs Ordered and Resulted from Time of ED Arrival to Time of ED Departure - No data to display    Assessments & Plan (with Medical Decision Making)   Patient presenting with decompensation of mood disorder symptoms, leading to decreased need for sleep, along with increased energy, rapid and pressured speech, increased agitation, irritability, and mood lability. Off medications for about 1 year. Has prn olanzapine, which she has been using PRN. Interested in restarting quetiapine, which has effectively stabilized mood previously. Nursing notes reviewed noting no acute issues.     I have reviewed the assessment completed by the [facility].     Preliminary diagnosis:    ICD-10-CM    1. Bipolar disorder, current episode mixed, moderate (H)  F31.62       2. Anxiety  F41.9       3. PTSD (post-traumatic stress disorder)  F43.10            Treatment Plan:  - Prescribe quetiapine  mg daily for mood stabilization in the context of bipolar " disorder  - Continue to utilize olanzapine 2.5 mg to 5 mg bid prn for acute agitation, which she already has at home. Consider speaking with psychiatrist about switching to prn quetiapine to reduce polypharmacy. She does find prn olanzapine mildly helpful.   - Patient will be provided with appointments for outpatient individual psychotherapy as well as psychiatric medication management.  - Patient to be discharged home this evening      After a period of working with the treatment team on the [facility] unit, the patient's mental state improved to allow a safe transition to outpatient care. After counseling on the diagnosis, work-up, and treatment plan, the patient was discharged. Close follow-up with a psychiatrist and/or therapist was recommended and community psychiatric resources were provided. Patient is to return to the ED if any urgent or potentially life-threatening concerns.     At the time of discharge, the patient's acute suicide risk was determined to be low due to the following factors: Reduction in the intensity of mood/anxiety symptoms that preceded the admission, denial of suicidal thoughts, denies feeling helpless or helpless, not currently under the influence of alcohol or illicit substances, denies experiencing command hallucinations, no immediate access to firearms. The patient's acute risk could be higher if noncompliant with their treatment plan, medications, follow-up appointments or using illicit substances or alcohol. Protective factors include: social supports, children, stable housing, employment    --  Moses Huang CNP   [facility] EMERGENCY DEPT  [facility] Unit      Moses Huang CNP  07/17/24 1952
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
## 2024-07-18 NOTE — TELEPHONE ENCOUNTER
RN Triage    Patient Contact    Attempt # 1    Was call answered?  No.  Left message on voicemail with information to call me back.    Upon call back please do ED F/U screening      Ninoska Jay RN  [facility] - Registered Nurse  Clinic Triage [place]   July 18, 2024

## 2024-07-18 NOTE — DISCHARGE INSTRUCTIONS
Upcoming Appointment(s):    Appointment Type: Medication Mgmt - Initial (In-Person)  Date/Time: Monday, 7/22/2024 at 1:00 pm - 2:00 pm  Provider: Kendra CHICAS  CNP,RN  Location: [address] [address]  Phone: [phone]    Patient Instructions:   1. All intake paperwork will be completed electronically, at least 24 hours prior to the appointment.   2. Appointment will be confirmed after all intake paperwork is completed.   3. Bring Insurance and discharge paperwork.   4. Arrive 10-15 minutes early for insurance verification. Location is close to the main road and bus route. Free parking.The office is on the 3rd floor.    Appointment Type: Therapy - Initial (In-Person)  Date/Time: Wednesday, 7/24/2024 at 11:00 am - 1:00 pm  Provider: Roxanne Mcguire MA  LMFT  Location: [facility] [employer], [address] [place] [address]  Phone: [phone]    Patient Instructions:  An email will be sent to you confirming the session, with instructions to complete intake forms prior to the first session. [email or web address]

## 2024-07-18 NOTE — TELEPHONE ENCOUNTER
Spoke with PT about follow-up care. Offered additional appts/resources and patient declined. No further follow-up needed.

## 2024-07-19 NOTE — TELEPHONE ENCOUNTER
Closing encounter BH doing outreach F/U.    Ninoska Jay RN  [facility] - Registered Nurse  Clinic Triage [place]   July 19, 2024

## 2024-08-06 ENCOUNTER — TELEPHONE (OUTPATIENT)
Dept: BEHAVIORAL HEALTH | Facility: CLINIC | Age: 39
End: 2024-08-06
Payer: COMMERCIAL

## 2024-08-06 NOTE — TELEPHONE ENCOUNTER
Coordinator Crisis Call Handoff    Writer received incoming call from pt on the Behavioral Health Care Providers queue.  Pt's current location is [address] [place], [place] [address]  [place] [address]  and their phone number is [phone]. Pt expressed feeling that they are currently in crisis. Writer asked pt if they are having any thoughts of killing yourself or hurting others? Pt stated Yes, patient was sitting on the side of the road with suicidal ideation and planning to look up painless ways to complete suicide. Because pt did endorse risk of harm to self of others, the following steps were taken:  Writer called [facility] Non-Emergency number to request transport to the [facility] Emergency Department. Patient cooperative and willing to come in for crisis assessment and writer stayed on the phone with patient until officer arrived on site. Writer confirmed with officer via phone he was there to bring patient to the Emergency Department.     David Gallagher, Triage and Transition Services

## 2024-08-22 ENCOUNTER — OFFICE VISIT (OUTPATIENT)
Dept: URGENT CARE | Facility: URGENT CARE | Age: 39
End: 2024-08-22
Payer: COMMERCIAL

## 2024-08-22 VITALS
SYSTOLIC BLOOD PRESSURE: 110 MMHG | OXYGEN SATURATION: 97 % | DIASTOLIC BLOOD PRESSURE: 74 MMHG | BODY MASS INDEX: 48.66 KG/M2 | WEIGHT: 292.4 LBS | HEART RATE: 77 BPM | RESPIRATION RATE: 16 BRPM | TEMPERATURE: 98.7 F

## 2024-08-22 DIAGNOSIS — R10.13 EPIGASTRIC PAIN: ICD-10-CM

## 2024-08-22 DIAGNOSIS — K62.5 RECTAL BLEEDING: Primary | ICD-10-CM

## 2024-08-22 PROCEDURE — 99214 OFFICE O/P EST MOD 30 MIN: CPT | Performed by: STUDENT IN AN ORGANIZED HEALTH CARE EDUCATION/TRAINING PROGRAM

## 2024-08-22 RX ORDER — COCOA BUTTER, PHENYLEPHRINE HYDROCHLORIDE 2211; 6.25 MG/1; MG/1
1 SUPPOSITORY RECTAL DAILY PRN
Qty: 24 SUPPOSITORY | Refills: 0 | Status: SHIPPED | OUTPATIENT
Start: 2024-08-22 | End: 2024-08-22

## 2024-08-22 RX ORDER — COCOA BUTTER, PHENYLEPHRINE HYDROCHLORIDE 2211; 6.25 MG/1; MG/1
1 SUPPOSITORY RECTAL 3 TIMES DAILY PRN
Qty: 24 SUPPOSITORY | Refills: 0 | Status: SHIPPED | OUTPATIENT
Start: 2024-08-22 | End: 2024-09-12

## 2024-08-22 RX ORDER — SUCRALFATE 1 G/1
1 TABLET ORAL 4 TIMES DAILY
Qty: 56 TABLET | Refills: 0 | Status: SHIPPED | OUTPATIENT
Start: 2024-08-22 | End: 2024-09-05

## 2024-08-22 NOTE — LETTER
August 22, 2024      Racquel Aguilar  [address]   [place] [facility] [place] [address]        To Whom It May Concern:    Racquel Aguilar  was seen on 8/22/24.  Please excuse her from work 8/22-8/25 due to illness. May return to work 8/26/24 without restrictions.        Sincerely,        DEMOND Hedrick CNP

## 2024-08-22 NOTE — PATIENT INSTRUCTIONS
Start prilosec (omeprazole) twice daily    Start preparation H suppositories 3 times daily as needed    Start sucralfate 4 times daily to coat stomach lining     GI referral - You will receive a call from a  to set up an appointment.    Gretel Contreras, CNP

## 2024-08-22 NOTE — PROGRESS NOTES
Assessment & Plan     Rectal bleeding  Ongoing for one year. For the past 3 days has had some epigastric discomfort without severe pain or cramping and passing 1-2 small clots rectally per day. She does not have external hemorrhoids. Hgb is normal 2 weeks ago at 13.2. Has not had colonoscopy to evaluate for internal hemorrhoids or other causes for bleeding. I advised starting preparation H suppositories as needed, Carafate and prilosec for upper abdominal discomfort and GI referral to consult and order colonoscopy or other further studies to evaluate.   - phenylephrine-cocoa butter (PREPARATION H) 0.25-88.44 % suppository  Dispense: 24 suppository; Refill: 0  - Adult GI  Referral - Consult Only    Epigastric pain  - omeprazole (PRILOSEC) 20 MG DR capsule  Dispense: 60 capsule; Refill: 0  - sucralfate (CARAFATE) 1 GM tablet  Dispense: 56 tablet; Refill: 0  - Adult GI  Referral - Consult Only     30 minutes spent on the date of the encounter doing chart review, patient visit, and documentation     No follow-ups on file.    DEMOND Hedrick [facility] [place]    Lindsay Clement is a 38 year old female who presents to clinic today for the following health issues:  Chief Complaint   Patient presents with    Rectal Bleeding     Rectal bleeding, clots and abdominal discomfort for a year - worse over the last 3 days.        HPI      Review of Systems  Constitutional, HEENT, cardiovascular, pulmonary, GI, , musculoskeletal, neuro, skin, endocrine and psych systems are negative, except as otherwise noted.      Objective    /74 (BP Location: Left arm, Cuff Size: Adult Large)   Pulse 77   Temp 98.7  F (37.1  C) (Tympanic)   Resp 16   Wt 132.6 kg (292 lb 6.4 oz)   LMP 07/08/2024 (Exact Date)   SpO2 97%   BMI 48.66 kg/m    Physical Exam   GENERAL: alert and no distress  ABDOMEN: soft, no focal tenderness to palpation, no hepatomegaly or splenomegaly  MS: no  gross musculoskeletal defects noted, no edema  SKIN: no suspicious lesions or rashes  NEURO: Normal strength and tone, mentation intact and speech normal  PSYCH: mentation appears normal, affect normal/bright

## 2024-08-23 ENCOUNTER — TELEPHONE (OUTPATIENT)
Dept: GASTROENTEROLOGY | Facility: CLINIC | Age: 39
End: 2024-08-23
Payer: COMMERCIAL

## 2024-08-23 NOTE — TELEPHONE ENCOUNTER
[facility] Call Center    Phone Message    May a detailed message be left on voicemail: Yes    Reason for Call: Other: Patient is currently scheduled on 10/02/2024, as visit type New GI Urgent. This is outside the expected timeline for this referral. Patient has been added to the waitlist.      Action Taken: Message routed to:  Other: GI REFERRAL TRIAGE POOL     Travel Screening: Not Applicable

## 2024-09-06 ENCOUNTER — LAB (OUTPATIENT)
Dept: LAB | Facility: CLINIC | Age: 39
End: 2024-09-06
Payer: COMMERCIAL

## 2024-09-06 ENCOUNTER — VIRTUAL VISIT (OUTPATIENT)
Dept: FAMILY MEDICINE | Facility: CLINIC | Age: 39
End: 2024-09-06
Payer: COMMERCIAL

## 2024-09-06 DIAGNOSIS — R30.0 DYSURIA: ICD-10-CM

## 2024-09-06 DIAGNOSIS — Z11.3 SCREENING EXAMINATION FOR VENEREAL DISEASE: ICD-10-CM

## 2024-09-06 DIAGNOSIS — Z11.3 SCREENING EXAMINATION FOR VENEREAL DISEASE: Primary | ICD-10-CM

## 2024-09-06 LAB
ALBUMIN UR-MCNC: NEGATIVE MG/DL
APPEARANCE UR: CLEAR
BACTERIA #/AREA URNS HPF: ABNORMAL /HPF
BILIRUB UR QL STRIP: NEGATIVE
CLUE CELLS: ABNORMAL
COLOR UR AUTO: YELLOW
GLUCOSE UR STRIP-MCNC: NEGATIVE MG/DL
HGB UR QL STRIP: ABNORMAL
KETONES UR STRIP-MCNC: NEGATIVE MG/DL
LEUKOCYTE ESTERASE UR QL STRIP: NEGATIVE
NITRATE UR QL: NEGATIVE
PH UR STRIP: 7.5 [PH] (ref 5–7)
RBC #/AREA URNS AUTO: ABNORMAL /HPF
SP GR UR STRIP: 1.01 (ref 1–1.03)
SQUAMOUS #/AREA URNS AUTO: ABNORMAL /LPF
TRICHOMONAS, WET PREP: ABNORMAL
UROBILINOGEN UR STRIP-ACNC: 0.2 E.U./DL
WBC #/AREA URNS AUTO: ABNORMAL /HPF
WBC'S/HIGH POWER FIELD, WET PREP: ABNORMAL
YEAST, WET PREP: ABNORMAL

## 2024-09-06 PROCEDURE — 36415 COLL VENOUS BLD VENIPUNCTURE: CPT

## 2024-09-06 PROCEDURE — 87210 SMEAR WET MOUNT SALINE/INK: CPT | Performed by: PHYSICIAN ASSISTANT

## 2024-09-06 PROCEDURE — 87389 HIV-1 AG W/HIV-1&-2 AB AG IA: CPT

## 2024-09-06 PROCEDURE — 87591 N.GONORRHOEAE DNA AMP PROB: CPT | Performed by: PHYSICIAN ASSISTANT

## 2024-09-06 PROCEDURE — 86780 TREPONEMA PALLIDUM: CPT

## 2024-09-06 PROCEDURE — 81001 URINALYSIS AUTO W/SCOPE: CPT

## 2024-09-06 PROCEDURE — 87491 CHLMYD TRACH DNA AMP PROBE: CPT | Performed by: PHYSICIAN ASSISTANT

## 2024-09-06 PROCEDURE — 99213 OFFICE O/P EST LOW 20 MIN: CPT | Mod: 95 | Performed by: PHYSICIAN ASSISTANT

## 2024-09-06 ASSESSMENT — ENCOUNTER SYMPTOMS
FEVER: 0
NAUSEA: 0
FREQUENCY: 0
ABDOMINAL PAIN: 0
DIAPHORESIS: 0
CHILLS: 0
HEMATURIA: 0
DYSURIA: 1

## 2024-09-06 NOTE — PROGRESS NOTES
Racquel is a 38 year old who is being evaluated via a billable video visit.    How would you like to obtain your AVS? MyChart  If the video visit is dropped, the invitation should be resent by: Text to cell phone: [phone]  Will anyone else be joining your video visit? No      Assessment & Plan     Screening examination for venereal disease  Dysuria  Patient is a 38-year-old female who presents to virtual visit requesting STI screening due to new partner.  Patient notes dysuria, vaginal burning, and change in vaginal discharge.  No rash.  STI screening discussed.  Will complete STI, UTI, BV, vaginal candidiasis screening as listed below.  Will base treatment on results.  - Vaginal-Chlamydia trachomatis/Neisseria gonorrhoeae by PCR  - HIV Antigen Antibody Combo Cascade; Future  - Treponema Abs w Reflex to RPR and Titer; Future  - Vagina-Wet preparation  - UA Macroscopic with reflex to Microscopic and Culture - Lab Collect; Future            See Patient Instructions    Subjective   Racquel is a 38 year old, presenting for the following health issues:  STD   Patient stated that she has no symptoms but she is worried because she had unprotected sex with a man that she is unsure about.         9/6/2024     8:42 AM   Additional Questions   Roomed by Jennifer MADERA MA   Accompanied by No one         9/6/2024     8:42 AM   Patient Reported Additional Medications   Patient reports taking the following new medications None     STD  Pertinent negatives include no abdominal pain, chills, diaphoresis, fever or nausea.      Patient had an encounter with an old partner and would like to complete STI testing. Since patient has had burning in the vaginal area. She has history of HSV with one outbreak, but this feels different. No rash. Patient is also spotting. Patient uses Depo shot and is not due for update until October. Slight yellow discharge without change in odor. Symptoms feel like BV. Mild dysuria.       Review of Systems    Constitutional:  Negative for chills, diaphoresis and fever.   Gastrointestinal:  Negative for abdominal pain and nausea.   Genitourinary:  Positive for dysuria, vaginal bleeding, vaginal discharge and vaginal pain. Negative for frequency, genital sores, hematuria and urgency.           Objective           Vitals:  No vitals were obtained today due to virtual visit.    Physical Exam   GENERAL: alert and no distress  EYES: Eyes grossly normal to inspection.  No discharge or erythema, or obvious scleral/conjunctival abnormalities.  RESP: No audible wheeze, cough, or visible cyanosis.    SKIN: Visible skin clear. No significant rash, abnormal pigmentation or lesions.  NEURO: Cranial nerves grossly intact.  Mentation and speech appropriate for age.  PSYCH: Appropriate affect, tone, and pace of words        Video-Visit Details    Type of service:  Video Visit   Originating Location (pt. Location): Home    Distant Location (provider location):  On-site  Platform used for Video Visit: Eddie  Signed Electronically by: Kaylee Layton PA-C

## 2024-09-06 NOTE — PATIENT INSTRUCTIONS
I have placed lab orders to complete lab work as discussed.  Please call your home clinic to schedule a lab only appointment.  We will base treatment on results if needed.  Reach out with questions or concerns.  Follow-up in clinic for new or worsening symptoms.  Safer Sex: Care Instructions  Overview  Safer sex is a way to reduce your risk of getting a sexually transmitted infection (STI). It can also help prevent pregnancy.  Several products can help you practice safer sex and reduce your chance of STIs. One of the best is a condom. There are internal and external condoms. You can use a special rubber sheet (dental dam) for protection during oral sex. Disposable gloves can keep your hands from touching blood, semen, or other body fluids that can carry infections.  Remember that birth control methods such as diaphragms, IUDs, foams, and birth control pills do not stop you from getting STIs.  Follow-up care is a key part of your treatment and safety. Be sure to make and go to all appointments, and call your doctor if you are having problems. It's also a good idea to know your test results and keep a list of the medicines you take.  How can you care for yourself at home?  Think about getting vaccinated to help prevent hepatitis A, hepatitis B, and human papillomavirus (HPV). They can be spread through sex.  Use a condom every time you have sex. Use an external condom, which goes on the penis. Or use an internal condom, which goes into the vagina or anus.  Make sure you use the right size external condom. A condom that's too small can break easily. A condom that's too big can slip off during sex.  Use a new condom each time you have sex. Be careful not to poke a hole in the condom when you open the wrapper.  Don't use an internal condom and an external condom at the same time.  Never use petroleum jelly (such as Vaseline), grease, hand lotion, baby oil, or anything with oil in it. These products can make holes in the  "condom.  After intercourse, hold the edge of the condom as you remove it. This will help keep semen from spilling out of the condom.  Do not have sex with anyone who has symptoms of an STI, such as sores on the genitals or mouth.  Do not drink a lot of alcohol or use drugs before sex.  Limit your sex partners. Sex with one partner who has sex only with you can reduce your risk of getting an STI.  Don't share sex toys. But if you do share them, use a condom and clean the sex toys between each use.  Talk to any partners before you have sex. Talk about what you feel comfortable with and whether you have any boundaries with sex. And find out if your partner or partners may be at risk for any STI. Keep in mind that a person may be able to spread an STI even if they do not have symptoms. You and any partners may want to get tested for STIs.  Where can you learn more?  Go to https://www.[employer].net/patiented  Enter B608 in the search box to learn more about \"Safer Sex: Care Instructions.\"  Current as of: November 27, 2023               Content Version: 14.0    2663-3873 [employer].   Care instructions adapted under license by your healthcare professional. If you have questions about a medical condition or this instruction, always ask your healthcare professional. [employer] disclaims any warranty or liability for your use of this information.      "

## 2024-09-07 LAB
C TRACH DNA SPEC QL PROBE+SIG AMP: NEGATIVE
HIV 1+2 AB+HIV1 P24 AG SERPL QL IA: NONREACTIVE
N GONORRHOEA DNA SPEC QL NAA+PROBE: NEGATIVE
T PALLIDUM AB SER QL: NONREACTIVE

## 2024-09-11 ENCOUNTER — NURSE TRIAGE (OUTPATIENT)
Dept: FAMILY MEDICINE | Facility: CLINIC | Age: 39
End: 2024-09-11
Payer: COMMERCIAL

## 2024-09-11 NOTE — TELEPHONE ENCOUNTER
RN Triage    Patient Contact    Attempt # 2    RN did attempt to reach pstirnt. No answer. Message left for patient to call the clinic back and ask to speak to a triage nurse.     Desiree Abarca RN on 9/11/2024 at 5:30 PM

## 2024-09-11 NOTE — TELEPHONE ENCOUNTER
"  Nurse Triage SBAR    Is this a 2nd Level Triage? YES, LICENSED PRACTITIONER REVIEW IS REQUIRED    Situation: Patient reports abdominal pain 9/10 and rectal bleeding. Reports the rectal bleeding has been on and off for a year. Reports abdominal pain and nausea have been present for the last month.   Reports three episodes of diarrhea today with bright red clots. Cloths are dime sized and bright red in color. Reports symptoms worsening over the last month.     Background: Patient has been in Urgent care for this problem on 8/22/24. Patient has a colonoscopy scheduled for 10/2024.     Assessment:   Abdominal pain is bilateral below belly button stabbing pain. Reports nausea but no vomiting. Blood noted on toilet paper and in toilet. Reports about a tablespoon of blood with bowel movement. She was tearful on the phone and stated she has been seen many times for this and was told it was nothing. She said \"this is not nothing.\" Reports not being able to work because of this.     Protocol Recommended Disposition:   Go To ED/UCC Now (Or To Office With PCP Approval)    Recommendation: PCP to review and advise where patient should be seen.      Routed to provider    Does the patient meet one of the following criteria for ADS visit consideration? 16+ years old, with an [facility] PCP     TIP  Providers, please consider if this condition is appropriate for management at one of our Acute and Diagnostic Services sites.     If patient is a good candidate, please use dotphrase <dot>triageresponse and select Refer to ADS to document.     Reason for Disposition   SEVERE abdominal pain (e.g., excruciating)    Additional Information   Negative: Passed out (i.e., fainted, collapsed and was not responding)   Negative: Shock suspected (e.g., cold/pale/clammy skin, too weak to stand, low BP, rapid pulse)   Negative: Vomiting red blood or black (coffee ground) material   Negative: Sounds like a life-threatening emergency to the triager   " "Negative: SEVERE rectal bleeding (large blood clots; constant or on and off bleeding)   Negative: SEVERE dizziness (e.g., unable to stand, requires support to walk, feels like passing out now)   Negative: MODERATE rectal bleeding (small blood clots, passing blood without stool, or toilet water turns red) more than once a day   Negative: Bloody, black, or tarry bowel movements  (Exception: Chronic-unchanged black-grey bowel movements and is taking iron pills or Pepto-Bismol.)   Negative: High-risk adult (e.g., prior surgery on aorta, abdominal aortic aneurysm)   Negative: Rectal foreign body (inserted or swallowed)    Answer Assessment - Initial Assessment Questions  1. APPEARANCE of BLOOD: \"What color is it?\" \"Is it passed separately, on the surface of the stool, or mixed in with the stool?\"       Bright red   2. AMOUNT: \"How much blood was passed?\"       Clots size of a dime,   3. FREQUENCY: \"How many times has blood been passed with the stools?\"       3 bowel movements today   4. ONSET: \"When was the blood first seen in the stools?\" (Days or weeks)       On and off for a year, worse in last month   5. DIARRHEA: \"Is there also some diarrhea?\" If Yes, ask: \"How many diarrhea stools in the past 24 hours?\"       X 3 today   6. CONSTIPATION: \"Do you have constipation?\" If Yes, ask: \"How bad is it?\"      no  7. RECURRENT SYMPTOMS: \"Have you had blood in your stools before?\" If Yes, ask: \"When was the last time?\" and \"What happened that time?\"       Yes   8. BLOOD THINNERS: \"Do you take any blood thinners?\" (e.g., Coumadin/warfarin, Pradaxa/dabigatran, aspirin)      No   9. OTHER SYMPTOMS: \"Do you have any other symptoms?\"  (e.g., abdomen pain, vomiting, dizziness, fever)      Abdominal pain x 1 month   10. PREGNANCY: \"Is there any chance you are pregnant?\" \"When was your last menstrual period?\"        No    Protocols used: Rectal Bleeding-A-OH    "

## 2024-09-11 NOTE — TELEPHONE ENCOUNTER
RN Triage    Patient Contact    Attempt # 1    RN did attempt to reach patient. No answer. Message left for patient to call the clinic back and ask to speak to a triage nurse.     Danna Bojorquez RN on 9/11/2024 at 4:53 PM

## 2024-09-12 ENCOUNTER — OFFICE VISIT (OUTPATIENT)
Dept: FAMILY MEDICINE | Facility: CLINIC | Age: 39
End: 2024-09-12
Payer: COMMERCIAL

## 2024-09-12 ENCOUNTER — ANCILLARY PROCEDURE (OUTPATIENT)
Dept: GENERAL RADIOLOGY | Facility: CLINIC | Age: 39
End: 2024-09-12
Attending: PHYSICIAN ASSISTANT
Payer: COMMERCIAL

## 2024-09-12 VITALS
BODY MASS INDEX: 47.28 KG/M2 | HEART RATE: 64 BPM | DIASTOLIC BLOOD PRESSURE: 74 MMHG | SYSTOLIC BLOOD PRESSURE: 116 MMHG | HEIGHT: 65 IN | TEMPERATURE: 97.7 F | RESPIRATION RATE: 16 BRPM | WEIGHT: 283.8 LBS | OXYGEN SATURATION: 96 %

## 2024-09-12 DIAGNOSIS — Z87.19 HISTORY OF CONSTIPATION: ICD-10-CM

## 2024-09-12 DIAGNOSIS — R10.84 ABDOMINAL PAIN, GENERALIZED: Primary | ICD-10-CM

## 2024-09-12 DIAGNOSIS — K64.4 EXTERNAL HEMORRHOIDS: ICD-10-CM

## 2024-09-12 LAB
BASOPHILS # BLD AUTO: 0 10E3/UL (ref 0–0.2)
BASOPHILS NFR BLD AUTO: 0 %
EOSINOPHIL # BLD AUTO: 0.2 10E3/UL (ref 0–0.7)
EOSINOPHIL NFR BLD AUTO: 1 %
ERYTHROCYTE [DISTWIDTH] IN BLOOD BY AUTOMATED COUNT: 12.3 % (ref 10–15)
ERYTHROCYTE [SEDIMENTATION RATE] IN BLOOD BY WESTERGREN METHOD: 18 MM/HR (ref 0–20)
HCT VFR BLD AUTO: 39.6 % (ref 35–47)
HGB BLD-MCNC: 13 G/DL (ref 11.7–15.7)
IMM GRANULOCYTES # BLD: 0 10E3/UL
IMM GRANULOCYTES NFR BLD: 0 %
LYMPHOCYTES # BLD AUTO: 3.2 10E3/UL (ref 0.8–5.3)
LYMPHOCYTES NFR BLD AUTO: 26 %
MCH RBC QN AUTO: 29 PG (ref 26.5–33)
MCHC RBC AUTO-ENTMCNC: 32.8 G/DL (ref 31.5–36.5)
MCV RBC AUTO: 88 FL (ref 78–100)
MONOCYTES # BLD AUTO: 0.9 10E3/UL (ref 0–1.3)
MONOCYTES NFR BLD AUTO: 7 %
NEUTROPHILS # BLD AUTO: 8.3 10E3/UL (ref 1.6–8.3)
NEUTROPHILS NFR BLD AUTO: 66 %
PLATELET # BLD AUTO: 303 10E3/UL (ref 150–450)
RBC # BLD AUTO: 4.49 10E6/UL (ref 3.8–5.2)
WBC # BLD AUTO: 12.6 10E3/UL (ref 4–11)

## 2024-09-12 PROCEDURE — 85652 RBC SED RATE AUTOMATED: CPT | Performed by: PHYSICIAN ASSISTANT

## 2024-09-12 PROCEDURE — 86140 C-REACTIVE PROTEIN: CPT | Performed by: PHYSICIAN ASSISTANT

## 2024-09-12 PROCEDURE — 74019 RADEX ABDOMEN 2 VIEWS: CPT | Mod: TC | Performed by: RADIOLOGY

## 2024-09-12 PROCEDURE — 36415 COLL VENOUS BLD VENIPUNCTURE: CPT | Performed by: PHYSICIAN ASSISTANT

## 2024-09-12 PROCEDURE — 80053 COMPREHEN METABOLIC PANEL: CPT | Performed by: PHYSICIAN ASSISTANT

## 2024-09-12 PROCEDURE — 82248 BILIRUBIN DIRECT: CPT | Performed by: PHYSICIAN ASSISTANT

## 2024-09-12 PROCEDURE — 85025 COMPLETE CBC W/AUTO DIFF WBC: CPT | Performed by: PHYSICIAN ASSISTANT

## 2024-09-12 PROCEDURE — 99214 OFFICE O/P EST MOD 30 MIN: CPT | Performed by: PHYSICIAN ASSISTANT

## 2024-09-12 RX ORDER — ARIPIPRAZOLE 15 MG/1
15 TABLET ORAL DAILY
COMMUNITY

## 2024-09-12 RX ORDER — TRAZODONE HYDROCHLORIDE 50 MG/1
50 TABLET, FILM COATED ORAL AT BEDTIME
COMMUNITY

## 2024-09-12 RX ORDER — POLYETHYLENE GLYCOL 3350 17 G/17G
1 POWDER, FOR SOLUTION ORAL DAILY
Qty: 238 G | Refills: 0 | Status: SHIPPED | OUTPATIENT
Start: 2024-09-12 | End: 2024-09-26

## 2024-09-12 ASSESSMENT — PAIN SCALES - GENERAL: PAINLEVEL: MODERATE PAIN (5)

## 2024-09-12 NOTE — TELEPHONE ENCOUNTER
RN Triage    Patient Contact    Attempt # 3    Was call answered?  No.  Left message on voicemail with information to call me back.    Sent MyChart    Upon call back please relay below message    Advise ER for evaluation and management.  KRYS Muniz Post COLEMAN  [facility] - Registered Nurse  Clinic Triage [place]   September 12, 2024

## 2024-09-12 NOTE — PROGRESS NOTES
Assessment & Plan     (R10.84) Abdominal pain, generalized  (primary encounter diagnosis)  Comment: Patient presents for evaluation of ongoing abdominal pain.  Has been having months to years of abdominal discomfort.  Has upcoming appointment with gastroenterology.  Patient is on Abilify.  Prior CT scans dating back to 2018 have shown moderate stool burden.  Discussed with patient possibility of constipation will trial MiraLAX daily for the next 2 weeks.  Family history of diverticulitis without any known history of inflammatory bowel disease.  Discussed inflammatory markers as well as ultrasound of the abdomen.  At times does have more epigastric discomfort.  Possibility of biliary colic.  Highest suspicion remains for constipation.  Patient does have nonthrombosed external hemorrhoids on examination.  No bright red blood per rectum.  The patient develops any new or severe abdominal pain will be seen again more emergently.  Plan: CBC with platelets and differential, Hepatic         panel (Albumin, ALT, AST, Bili, Alk Phos, TP),         Basic metabolic panel  (Ca, Cl, CO2, Creat,         Gluc, K, Na, BUN), CRP, inflammation, ESR:         Erythrocyte sedimentation rate, US Abdomen         Complete          (Z87.19) History of constipation  Comment:   Plan: XR Abdomen 2 Views, polyethylene glycol         (MIRALAX) 17 GM/Dose powder            (K64.4) External hemorrhoids  Comment:   Plan: Adult Colorectal Surgery  Referral            Subjective   Racquel is a 38 year old, presenting for the following health issues:  Establish Care, Lab Result Notice (Go over lab results. ), and Letter for School/Work      9/12/2024     1:59 PM   Additional Questions   Roomed by KAZ AMIN   Accompanied by SELF     History of Present Illness       Reason for visit:  Establish care go over test results   She is taking medications regularly.     Patient presents for evaluation of multiple complaints.  Patient has history of  "bipolar following with psychiatry through [facility]. Abilify, Seroquel, trazodone.     She has felt more stable for mental health, however, now has been more focused on physical discomfort has been experiencing.  Patient reports abdominal pain intermittent for more than 1 year.  No prior surgeries to the abdomen.  Will have multiple small bowel movements throughout the day.  Stools are watery.  Has had some blood within the toilet following bowel movements.  Was told previously she had hemorrhoids with pregnancy.  At times will have diffuse upper abdominal discomfort.  No current vomiting.   No fevers.  Negative pregnancy test 1 month ago.  On Depo.      Review of Systems  Constitutional, HEENT, cardiovascular, pulmonary, gi and gu systems are negative, except as otherwise noted.      Objective    /74   Pulse 64   Temp 97.7  F (36.5  C) (Tympanic)   Resp 16   Ht 1.66 m (5' 5.35\")   Wt 128.7 kg (283 lb 12.8 oz)   SpO2 96%   BMI 46.72 kg/m    Body mass index is 46.72 kg/m .  Physical Exam   GENERAL: alert, no distress, and obese  RESP: lungs clear to auscultation - no rales, rhonchi or wheezes  CV: regular rate and rhythm, normal S1 S2, no S3 or S4, no murmur, click or rub, no peripheral edema  ABDOMEN: Bowel sounds present.  Diffuse tenderness more prominent over the left aspect of the abdomen.  No rebound or guarding.  RECTAL (female): External hemorrhoid without any active bleeding and normal rectal tone (female chaperone present at bedside Rgetel)  MS: no gross musculoskeletal defects noted, no edema      Signed Electronically by: Elvis Hawkins PA-C    "

## 2024-09-13 LAB
ALBUMIN SERPL BCG-MCNC: 3.9 G/DL (ref 3.5–5.2)
ALP SERPL-CCNC: 59 U/L (ref 40–150)
ALT SERPL W P-5'-P-CCNC: 25 U/L (ref 0–50)
ANION GAP SERPL CALCULATED.3IONS-SCNC: 8 MMOL/L (ref 7–15)
AST SERPL W P-5'-P-CCNC: 21 U/L (ref 0–45)
BILIRUB DIRECT SERPL-MCNC: <0.2 MG/DL (ref 0–0.3)
BILIRUB SERPL-MCNC: 0.2 MG/DL
BUN SERPL-MCNC: 18.1 MG/DL (ref 6–20)
CALCIUM SERPL-MCNC: 9.2 MG/DL (ref 8.8–10.4)
CHLORIDE SERPL-SCNC: 104 MMOL/L (ref 98–107)
CREAT SERPL-MCNC: 0.62 MG/DL (ref 0.51–0.95)
CRP SERPL-MCNC: 27.3 MG/L
EGFRCR SERPLBLD CKD-EPI 2021: >90 ML/MIN/1.73M2
GLUCOSE SERPL-MCNC: 89 MG/DL (ref 70–99)
HCO3 SERPL-SCNC: 27 MMOL/L (ref 22–29)
POTASSIUM SERPL-SCNC: 4.1 MMOL/L (ref 3.4–5.3)
PROT SERPL-MCNC: 7.4 G/DL (ref 6.4–8.3)
SODIUM SERPL-SCNC: 139 MMOL/L (ref 135–145)

## 2024-09-13 NOTE — TELEPHONE ENCOUNTER
Called x3 and sent [employer] message, no response or view of [place].   Closing encounter    Ninoska Jay RN  [facility] - Registered Nurse  Clinic Triage [place]   September 13, 2024

## 2024-09-30 ENCOUNTER — TELEPHONE (OUTPATIENT)
Dept: FAMILY MEDICINE | Facility: CLINIC | Age: 39
End: 2024-09-30
Payer: COMMERCIAL

## 2024-09-30 NOTE — TELEPHONE ENCOUNTER
Patient called in stating she went to the Chiropractor and they put her in traction.  She states she is in extreme pain now, can barely move or walk.  Patient stated the Chiropractor told her to go to the Emergency Room but she wanted to ask. Patient was crying on the phone when talking to writer.      Encouraged patient to go to the Emergency Room.  Explained they would be able to do the imaging necessary.  Patient verbalized understanding and stated she was going to go.  Discuss locations with patient as well.        Kristina Kjellberg, MSN, RN

## 2024-10-04 NOTE — TELEPHONE ENCOUNTER
Diagnosis, Referred by & from: External Hemorrhoids   Appt date: 12/19/2024   NOTES STATUS DETAILS   OFFICE NOTE from referring provider [facility]:  9/12/24 - PCC OV with RENATA Hester   OFFICE NOTE from other specialist Care Every[facility] / [facility]:  (Pending sale to [facility]) 11/19/24 - GI OV with RENATA Angel    [facility]:  8/22/24 - UC OV with Jeni Contreras NP    [facility]:  7/17/24, 7/11/24 - PCC OV with RENATA Powell    [facility]:  5/21/18 - OBGYN OV with Dr. Esparza   DISCHARGE SUMMARY from hospital N/A    DISCHARGE REPORT from the ER Care Everywhere [facility]:  4/6/24 - ED OV with Dr. Terry   OPERATIVE REPORT N/A    MEDICATION LIST Internal    LABS     ANAL PAP/CEA N/A    BIOPSIES/PATHOLOGY RELATED TO DIAGNOSIS N/A    DIAGNOSTIC PROCEDURES     PFC TESTING (from the Pelvic floor center includes Manometry, PDNL, EMG, etc.) N/A    COLONOSCOPY (most recent all time after 5 years) N/A    FLEX SIGMOIDOSCOPY N/A    UPPER ENDOSCOPY (EGD) N/A    ERCP N/A    IMAGING (DISC & REPORT)      CT [facility]:  (Pending sale to [facility]) 10/11/24 - CT Abd/Pelvis   MRI N/A    XRAY [facility]:  9/12/24 - XR Abdomen   ULTRASOUND  (ENDOANAL/ENDORECTAL) [facility]:  (Pending sale to [facility]) 10/9/24 - US Abdomen      This note was copied from the mother's chart. Encouraged to offer frequent feedings. Education given on hunger cues. Reviewed signs of adequate I & O;allow baby to feed ad abena & not to limit time at breast. Discussed ways to awaken baby for feedings including skin to skin. Instructed that baby may feed 8-12 times a day-cluster feeding at times -as her milk supply is being established. Discussed avoiding pacifiers during the first few weeks & encouraged rooming in. Given Lactation contact & [address]. Has EBp for home.

## 2024-10-06 ENCOUNTER — HEALTH MAINTENANCE LETTER (OUTPATIENT)
Age: 39
End: 2024-10-06

## 2024-10-15 ENCOUNTER — OFFICE VISIT (OUTPATIENT)
Dept: FAMILY MEDICINE | Facility: CLINIC | Age: 39
End: 2024-10-15
Payer: COMMERCIAL

## 2024-10-15 VITALS
OXYGEN SATURATION: 97 % | HEIGHT: 65 IN | HEART RATE: 72 BPM | BODY MASS INDEX: 48.42 KG/M2 | SYSTOLIC BLOOD PRESSURE: 131 MMHG | WEIGHT: 290.6 LBS | RESPIRATION RATE: 20 BRPM | DIASTOLIC BLOOD PRESSURE: 87 MMHG | TEMPERATURE: 97.5 F

## 2024-10-15 DIAGNOSIS — E66.813 CLASS 3 SEVERE OBESITY DUE TO EXCESS CALORIES WITH SERIOUS COMORBIDITY AND BODY MASS INDEX (BMI) OF 45.0 TO 49.9 IN ADULT (H): ICD-10-CM

## 2024-10-15 DIAGNOSIS — E66.01 CLASS 3 SEVERE OBESITY DUE TO EXCESS CALORIES WITH SERIOUS COMORBIDITY AND BODY MASS INDEX (BMI) OF 45.0 TO 49.9 IN ADULT (H): ICD-10-CM

## 2024-10-15 DIAGNOSIS — M54.41 ACUTE RIGHT-SIDED LOW BACK PAIN WITH RIGHT-SIDED SCIATICA: Primary | ICD-10-CM

## 2024-10-15 PROBLEM — F31.62 BIPOLAR MIXED AFFECTIVE DISORDER, MODERATE (H): Status: ACTIVE | Noted: 2024-08-07

## 2024-10-15 PROBLEM — F12.10 CANNABIS USE DISORDER, MILD, ABUSE: Status: ACTIVE | Noted: 2024-03-22

## 2024-10-15 PROBLEM — F41.1 GAD (GENERALIZED ANXIETY DISORDER): Chronic | Status: ACTIVE | Noted: 2024-07-17

## 2024-10-15 PROBLEM — F31.9 BIPOLAR 1 DISORDER (H): Chronic | Status: ACTIVE | Noted: 2024-07-11

## 2024-10-15 PROBLEM — G47.33 OSA (OBSTRUCTIVE SLEEP APNEA): Status: ACTIVE | Noted: 2021-06-08

## 2024-10-15 PROBLEM — F60.3 BORDERLINE PERSONALITY DISORDER (H): Status: ACTIVE | Noted: 2017-11-13

## 2024-10-15 PROCEDURE — 99214 OFFICE O/P EST MOD 30 MIN: CPT | Performed by: NURSE PRACTITIONER

## 2024-10-15 RX ORDER — CYCLOBENZAPRINE HCL 10 MG
10 TABLET ORAL
COMMUNITY
Start: 2024-09-30

## 2024-10-15 RX ORDER — NORETHINDRONE 0.35 MG/1
TABLET ORAL
COMMUNITY
Start: 2024-10-10

## 2024-10-15 RX ORDER — ARIPIPRAZOLE 20 MG/1
1 TABLET ORAL
COMMUNITY
Start: 2024-09-18

## 2024-10-15 RX ORDER — QUETIAPINE FUMARATE 25 MG/1
TABLET, FILM COATED ORAL
COMMUNITY
Start: 2024-08-20

## 2024-10-15 RX ORDER — SUMATRIPTAN SUCCINATE 25 MG/1
TABLET ORAL
COMMUNITY
Start: 2023-09-29

## 2024-10-15 ASSESSMENT — ANXIETY QUESTIONNAIRES
7. FEELING AFRAID AS IF SOMETHING AWFUL MIGHT HAPPEN: SEVERAL DAYS
2. NOT BEING ABLE TO STOP OR CONTROL WORRYING: NOT AT ALL
5. BEING SO RESTLESS THAT IT IS HARD TO SIT STILL: NEARLY EVERY DAY
6. BECOMING EASILY ANNOYED OR IRRITABLE: NEARLY EVERY DAY
4. TROUBLE RELAXING: NEARLY EVERY DAY
IF YOU CHECKED OFF ANY PROBLEMS ON THIS QUESTIONNAIRE, HOW DIFFICULT HAVE THESE PROBLEMS MADE IT FOR YOU TO DO YOUR WORK, TAKE CARE OF THINGS AT HOME, OR GET ALONG WITH OTHER PEOPLE: SOMEWHAT DIFFICULT
GAD7 TOTAL SCORE: 13
GAD7 TOTAL SCORE: 13
3. WORRYING TOO MUCH ABOUT DIFFERENT THINGS: SEVERAL DAYS
GAD7 TOTAL SCORE: 13
1. FEELING NERVOUS, ANXIOUS, OR ON EDGE: MORE THAN HALF THE DAYS
8. IF YOU CHECKED OFF ANY PROBLEMS, HOW DIFFICULT HAVE THESE MADE IT FOR YOU TO DO YOUR WORK, TAKE CARE OF THINGS AT HOME, OR GET ALONG WITH OTHER PEOPLE?: SOMEWHAT DIFFICULT
7. FEELING AFRAID AS IF SOMETHING AWFUL MIGHT HAPPEN: SEVERAL DAYS

## 2024-10-15 ASSESSMENT — ENCOUNTER SYMPTOMS: BACK PAIN: 1

## 2024-10-15 ASSESSMENT — PAIN SCALES - GENERAL: PAINLEVEL: NO PAIN (0)

## 2024-10-15 NOTE — PROGRESS NOTES
Assessment & Plan     Acute right-sided low back pain with right-sided sciatica  -Patient does not have any red flag symptoms including bowel or bladder incontinence, saddle anesthesia, or lower extremity weakness. Patient was instructed to go to the emergency department if he develops these symptoms.  -Avoid bed rest, increase daily stretches and activity as tolerated. Use ice during the first 48 hours as needed for pain, after that use heat. Recommend physical therapy if symptoms don't start to improve in the next 2 weeks.   -Okay to return to work, recommend limit to 8 hour work days.   - Physical Therapy  Referral; Future    Class 3 severe obesity due to excess calories with serious comorbidity and body mass index (BMI) of 45.0 to 49.9 in adult (H)  -She has lost 77 pounds through diet and exercise but has plateau at current weight with BMI of 47. She is motivated to continue losing weight as she knows this will go far in managing her back pain as well. Insurance will be changing in a month, recommend returning for preventative care visit then, can discuss possibly starting GLP-1 medication if covered by insurance at that time.             Subjective   Racquel is a 38 year old, presenting for the following health issues:  Back Pain        10/15/2024    10:09 AM   Additional Questions   Roomed by Nancy LOWERY   Accompanied by self     Works as a nursing assistant. Has chronic back pain, patient reports DDD. Went to chiropractor and was put in traction, had severe pain went to the ED and treated with medrol dosepack, naproxen, and flexaril. Doing better now.     Prior to exacerbation, was working 16 hour shifts, 3 doubles in a row.     Can walk, bathe, perform ADLs now independently.     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     Went to [facility]- will be seeing     Has lost a lot of weight- 367 to 290 lbs.       History of Present Illness       Back Pain:  She presents for follow up of back pain. Patient's  "back pain is a chronic problem.  Location of back pain:  Right lower back and right hip  Description of back pain: other  Back pain spreads: right buttocks, right thigh, right knee and right foot    Since patient first noticed back pain, pain is: gradually improving  Does back pain interfere with her job:  Yes       Mental Health Follow-up:  Patient presents to follow-up on Anxiety.    Patient's anxiety since last visit has been:  Worse  The patient is having other symptoms associated with anxiety.  Any significant life events: relationship concerns, financial concerns and health concerns  Patient is feeling anxious or having panic attacks.  Patient has no concerns about alcohol or drug use.    She eats 2-3 servings of fruits and vegetables daily.She consumes 0 sweetened beverage(s) daily.She exercises with enough effort to increase her heart rate 20 to 29 minutes per day.  She exercises with enough effort to increase her heart rate 3 or less days per week.   She is taking medications regularly.             Objective    /87   Pulse 72   Temp 97.5  F (36.4  C) (Tympanic)   Resp 20   Ht 1.66 m (5' 5.35\")   Wt 131.8 kg (290 lb 9.6 oz)   SpO2 97%   BMI 47.84 kg/m    Body mass index is 47.84 kg/m .  Physical Exam  Constitutional:       General: She is not in acute distress.     Appearance: Normal appearance. She is obese.   HENT:      Right Ear: External ear normal.      Left Ear: External ear normal.   Eyes:      Pupils: Pupils are equal, round, and reactive to light.   Cardiovascular:      Rate and Rhythm: Normal rate.      Pulses: Normal pulses.      Heart sounds: Normal heart sounds. No murmur heard.     No friction rub. No gallop.   Pulmonary:      Effort: Pulmonary effort is normal. No respiratory distress.      Breath sounds: No wheezing, rhonchi or rales.   Skin:     General: Skin is warm and dry.   Neurological:      General: No focal deficit present.      Mental Status: She is alert and oriented to " person, place, and time.      Deep Tendon Reflexes:      Reflex Scores:       Patellar reflexes are 2+ on the right side and 2+ on the left side.  Psychiatric:         Mood and Affect: Mood normal.         Behavior: Behavior normal.         Thought Content: Thought content normal.         Judgment: Judgment normal.                    Signed Electronically by: DEMOND Preston CNP

## 2024-10-15 NOTE — LETTER
October 15, 2024      Racquel Aguilar  [address]   [place] [address]        To Whom It May Concern:    Racquel Aguilar was seen in our clinic. She may return to work with the following: limited to 8 hour workday on or about 10/15/24. No lifting restriction.      Sincerely,      Chanell Sinclair

## 2024-10-16 ENCOUNTER — TELEPHONE (OUTPATIENT)
Dept: SLEEP MEDICINE | Facility: CLINIC | Age: 39
End: 2024-10-16

## 2024-10-16 NOTE — TELEPHONE ENCOUNTER
Patient needs to be rescheduled for their virtual visit due to Reason for Reschedule: Patient Request    Scheduling team, please refer to service line late cancellation/no-show policies and reach out to patient at a later date for rescheduling.    Appointment mode: Video  Provider: Noah Li PA Jacqueline McCaskill, Kimberly Facilitator

## 2024-10-23 ENCOUNTER — ALLIED HEALTH/NURSE VISIT (OUTPATIENT)
Dept: FAMILY MEDICINE | Facility: CLINIC | Age: 39
End: 2024-10-23
Payer: COMMERCIAL

## 2024-10-23 DIAGNOSIS — Z30.42 ENCOUNTER FOR SURVEILLANCE OF INJECTABLE CONTRACEPTIVE: Primary | ICD-10-CM

## 2024-10-23 PROCEDURE — 99207 PR NO CHARGE NURSE ONLY: CPT

## 2024-10-23 PROCEDURE — 96372 THER/PROPH/DIAG INJ SC/IM: CPT | Performed by: PHYSICIAN ASSISTANT

## 2024-10-23 RX ADMIN — MEDROXYPROGESTERONE ACETATE 150 MG: 150 INJECTION, SUSPENSION INTRAMUSCULAR at 11:38

## 2024-10-23 NOTE — PROGRESS NOTES

## 2024-11-13 ENCOUNTER — TELEPHONE (OUTPATIENT)
Dept: GASTROENTEROLOGY | Facility: CLINIC | Age: 39
End: 2024-11-13
Payer: COMMERCIAL

## 2024-11-13 NOTE — TELEPHONE ENCOUNTER
"LPN called patient to discuss her appointment on 11/19/24 and the need to reschedule to a different time. LPN introduced myself and patient stated \"I can't talk right now, I have to go\" and ended the call abruptly.     Leonarda Webber LPN    "

## 2024-11-15 ENCOUNTER — TELEPHONE (OUTPATIENT)
Dept: GASTROENTEROLOGY | Facility: CLINIC | Age: 39
End: 2024-11-15
Payer: COMMERCIAL

## 2024-11-15 NOTE — TELEPHONE ENCOUNTER
Called patient to switch appointment time later in the day on 11/19/2024 with Precious Nesbitt. No answer. Voice mailbox not set up.  Nataliia Preston

## 2024-11-20 ENCOUNTER — OFFICE VISIT (OUTPATIENT)
Dept: SLEEP MEDICINE | Facility: CLINIC | Age: 39
End: 2024-11-20
Attending: PHYSICIAN ASSISTANT
Payer: COMMERCIAL

## 2024-11-20 VITALS
SYSTOLIC BLOOD PRESSURE: 117 MMHG | DIASTOLIC BLOOD PRESSURE: 81 MMHG | BODY MASS INDEX: 48.2 KG/M2 | HEIGHT: 65 IN | WEIGHT: 289.3 LBS | HEART RATE: 75 BPM | OXYGEN SATURATION: 97 %

## 2024-11-20 DIAGNOSIS — F90.0 ADHD (ATTENTION DEFICIT HYPERACTIVITY DISORDER), INATTENTIVE TYPE: ICD-10-CM

## 2024-11-20 DIAGNOSIS — F43.10 PTSD (POST-TRAUMATIC STRESS DISORDER): ICD-10-CM

## 2024-11-20 DIAGNOSIS — F31.62 BIPOLAR MIXED AFFECTIVE DISORDER, MODERATE (H): ICD-10-CM

## 2024-11-20 DIAGNOSIS — G47.33 OSA (OBSTRUCTIVE SLEEP APNEA): Primary | ICD-10-CM

## 2024-11-20 DIAGNOSIS — S09.90XS INJURY OF HEAD, SEQUELA: ICD-10-CM

## 2024-11-20 DIAGNOSIS — F12.10 CANNABIS USE DISORDER, MILD, ABUSE: ICD-10-CM

## 2024-11-20 PROBLEM — F60.89 CLUSTER B PERSONALITY DISORDER (H): Status: ACTIVE | Noted: 2017-11-13

## 2024-11-20 RX ORDER — BUSPIRONE HYDROCHLORIDE 10 MG/1
10 TABLET ORAL 3 TIMES DAILY
COMMUNITY
Start: 2024-11-18

## 2024-11-20 ASSESSMENT — SLEEP AND FATIGUE QUESTIONNAIRES
HOW LIKELY ARE YOU TO NOD OFF OR FALL ASLEEP WHILE LYING DOWN TO REST IN THE AFTERNOON WHEN CIRCUMSTANCES PERMIT: HIGH CHANCE OF DOZING
HOW LIKELY ARE YOU TO NOD OFF OR FALL ASLEEP WHILE SITTING AND TALKING TO SOMEONE: WOULD NEVER DOZE
HOW LIKELY ARE YOU TO NOD OFF OR FALL ASLEEP IN A CAR, WHILE STOPPED FOR A FEW MINUTES IN TRAFFIC: WOULD NEVER DOZE
HOW LIKELY ARE YOU TO NOD OFF OR FALL ASLEEP WHEN YOU ARE A PASSENGER IN A CAR FOR AN HOUR WITHOUT A BREAK: SLIGHT CHANCE OF DOZING
HOW LIKELY ARE YOU TO NOD OFF OR FALL ASLEEP WHILE SITTING AND READING: HIGH CHANCE OF DOZING
HOW LIKELY ARE YOU TO NOD OFF OR FALL ASLEEP WHILE WATCHING TV: HIGH CHANCE OF DOZING
HOW LIKELY ARE YOU TO NOD OFF OR FALL ASLEEP WHILE SITTING INACTIVE IN A PUBLIC PLACE: SLIGHT CHANCE OF DOZING
HOW LIKELY ARE YOU TO NOD OFF OR FALL ASLEEP WHILE SITTING QUIETLY AFTER LUNCH WITHOUT ALCOHOL: MODERATE CHANCE OF DOZING

## 2024-11-20 NOTE — PROGRESS NOTES
Outpatient Sleep Medicine Consultation:      Name: Racquel Aguilar MRN# [id number]   Age: 38 year old YOB: 1985     Date of Consultation: November 20, 2024  Consultation is requested by: Racquel Armas PA-C  [address] [place] [address] Racquel Armas  Primary care provider: Racquel Armas       Reason for Sleep Consult:     Racquel Aguilar is sent by Racquel Armas for a sleep consultation regarding untreated AISLINN.    Patient s Reason for visit  Racquel Aguilar main reason for visit: (Patient-Rptd) i have sleep apnea and its really affecting my life  Patient states problem(s) started: (Patient-Rptd) late twenties more noticeable  Racquel Aguilar's goals for this visit: (Patient-Rptd) sleep study talk about treatment options           Assessment and Plan:     Impression/Plan:    ICD-10-CM    1. AISLINN (obstructive sleep apnea)  G47.33 Adult Sleep Eval & Management  Referral     CPAP Order for DME - ONLY FOR DME      2. Injury of head, sequela  S09.90XS CPAP Order for DME - ONLY FOR DME      3. Bipolar mixed affective disorder, moderate (H)  F31.62 CPAP Order for DME - ONLY FOR DME      4. Cannabis use disorder, mild, abuse  F12.10 CPAP Order for DME - ONLY FOR DME      5. PTSD (post-traumatic stress disorder)  F43.10 CPAP Order for DME - ONLY FOR DME      6. ADHD (attention deficit hyperactivity disorder), inattentive type  F90.0 CPAP Order for DME - ONLY FOR DME        Plans for Racquel Aguilar placement of a comprehensive order for needed supplies and equipment including a new CPAP machine with pressure set to 6-18 cm H2O.  Patient was instructed to use her CPAP therapy minimum of 4 hours each day, 70% of the time.  Optimally, patient will use her CPAP therapy the entire duration of her sleep including mask in order to ch maximum benefit.  Mask exchange policy also explained to patient.  Patient should make a return appointment approximately 12 weeks after she  obtains her new CPAP machine for an evaluation of efficacy and compliance.  Also recommend she optimize her sleep schedule as well as her sleep hygiene practices to mitigate any further sleep disruption.  Recommend that she employ safe driving practices such as not driving a motor vehicle should she become drowsy.  Recommend weight management to BMI of 30.0.    Patient has comorbidities of bipolar mixed affective disorder, cannabis use disorder, PTSD, ADHD, morbid obesity.    63 minutes spent with patient, all of which were spent face-to-face counseling, consulting, coordinating plan of care.      DEMOND Kenney CNP         History of Present Illness:     Racquel Aguilar presents to the sleep medicine clinic with concerns of known obstructive sleep apnea, which is currently untreated.    Racquel Aguilar has a medical history pertinent for bipolar 1 disorder, generalized anxiety disorder, PTSD, ADHD, known obstructive sleep apnea, right lobe liver lesion, cannabis use disorder, borderline personality, history of closed head injuries, thoracic and lumbosacral neuritis, atopic rhinitis, and cluster B personality disorder.  She is morbidly obese.    Was diagnosed with obstructive sleep apnea in 2021, was told CPAP machines were in short supply.  Did not follow through with obtaining one beyond that.    Significant sleep initiation insomnia as well as sleep maintenance insomnia.  Severe problems with waking too early, very dissatisfied with the quality of her sleep.  Receives medications to assist her with sleep initiation and sleep maintenance.    Suffers from socially disruptive snoring, snort arousals, witnessed episodes of apnea, multiple nocturnal awakenings for snort arousals and elimination needs, inadequate sleep hygiene, excessive daytime sleepiness, morning headaches, morning mouth dryness, nasal congestion upon awakening, multiple nocturnal leg movements, sleep eating, somnambulism, bruxism, weight  gain, pain at night, shortness of breath with lying flat/with activity/upon awakening, heart racing at night, swelling in feet and legs, anxiety and depression.    Discussed pathophysiology of obstructive sleep apnea as well as that of central sleep apnea.  Discussed implications of untreated sleep apnea in terms of her mental health as well as medical conditions.  Discussed benefits of PAP therapy.    Cannabis use disorder    Closed head injuries w/LOC x3    AISLINN (obstructive sleep apnea)  Was diagnosed in [place] a few years ago but never got a cpap. Referral to sleep med to start treating.     Tonsils: In    Neck Circumference: 43 cm    [place] Sleepiness Scale  Total score - [place]: 13   (Less than 10 normal)     Insomnia Severity Scale  SHAKA Total Score: 26  (normal 0-7, mild 8-14, moderate 15-21, severe 22-28)    CHEMICAL DEPENDENCY HISTORY:    She smokes marijuana up to 3 times a day. Over 10 years ago, she has a history of cocaine use, and up until about a month ago, she was drinking 4 drinks twice a week, but it was making her more irritable, so she stopped.  She has never been to CD treatment.    Social History:  , do not always sleep in the same room    Weight change since previous sleep study: +60 pounds    Past Sleep Evaluations:      SLEEP-WAKE SCHEDULE:     Work/School Days: Patient goes to school/work: (Patient-Rptd) Yes   Usually gets into bed at (Patient-Rptd) between 10 and 12am  Takes patient about (Patient-Rptd) hours without meds to fall asleep  Has trouble falling asleep (Patient-Rptd) 7 nights per week  Wakes up in the middle of the night (Patient-Rptd) not sure times.  Wakes up due to (Patient-Rptd) Snorting self awake  She has trouble falling back asleep (Patient-Rptd) 7 times a week.   It usually takes (Patient-Rptd) hour to get back to sleep  Patient is usually up at (Patient-Rptd) 5am or 7am  Uses alarm: (Patient-Rptd) Yes    Weekends/Non-work Days/All Other Days:  Usually gets  into bed at (Patient-Rptd) same maybe later   Takes patient about (Patient-Rptd) same to fall asleep  Patient is usually up at (Patient-Rptd) same  Uses alarm: (Patient-Rptd) No    Sleep Need  Patient gets  (Patient-Rptd) four hours sleep on average   Patient thinks she needs about (Patient-Rptd) more but four to five hours i can get by sleep    Racquel Aguilar prefers to sleep in this position(s): (Patient-Rptd) Side;Stomach   Patient states they do the following activities in bed: (Patient-Rptd) Watch TV;Use phone, computer, or tablet    Naps  Patient takes a purposeful nap (Patient-Rptd) two days times a week and naps are usually (Patient-Rptd) hours in duration  She feels better after a nap: (Patient-Rptd) No  She dozes off unintentionally (Patient-Rptd) 5 days per week  Patient has had a driving accident or near-miss due to sleepiness/drowsiness: (Patient-Rptd) No      SLEEP DISRUPTIONS:    Breathing/Snoring  Patient snores:(Patient-Rptd) Yes  Other people complain about her snoring: (Patient-Rptd) Yes  Patient has been told she stops breathing in her sleep:(Patient-Rptd) Yes  She has issues with the following: (Patient-Rptd) Morning headaches;Morning mouth dryness;Stuffy nose when you wake up.    Movement:  Patient gets pain, discomfort, with an urge to move:  (Patient-Rptd) Yes restless legs symptoms  It happens when she is resting:  (Patient-Rptd) Yes  It happens more at night:  (Patient-Rptd) Yes  Patient has been told she kicks her legs at night:  (Patient-Rptd) No     Behaviors in Sleep:  Racquel Aguilar has experienced the following behaviors while sleeping: (Patient-Rptd) Eating;Sleepwalking;Teeth grinding  She has experienced sudden muscle weakness during the day: (Patient-Rptd) No  Pt denies bruxism, sleep talking, sleep walking, and dream enactment behavior. Pt denies sleep paralysis, hypnagogue and cataplexy.       Is there anything else you would like your sleep provider to know:        CAFFEINE AND  OTHER SUBSTANCES:    Patient consumes caffeinated beverages per day:  (Patient-Rptd) 2  Last caffeine use is usually: (Patient-Rptd) five pm  List of any prescribed or over the counter stimulants that patient takes: (Patient-Rptd) none  List of any prescribed or over the counter sleep medication patient takes: (Patient-Rptd) seroquel trazodone  List of previous sleep medications that patient has tried: (Patient-Rptd) same  Patient drinks alcohol to help them sleep: (Patient-Rptd) No  Patient drinks alcohol near bedtime: (Patient-Rptd) No    Family History:  Patient has a family member been diagnosed with a sleep disorder: (Patient-Rptd) Yes  (Patient-Rptd) dad sleep apnea grandpa too         SCALES:    [place]WORTH SLEEPINESS SCALE         11/20/2024    12:56 PM    [place] Sleepiness Scale ( LEWIS Mckay  [id number]<br>ESS - [place]/English - Final version - 21 Nov 07 - [facility] [place].)   Sitting and reading High chance of dozing   Watching TV High chance of dozing   Sitting, inactive in a public place (e.g. a theatre or a meeting) Slight chance of dozing   As a passenger in a car for an hour without a break Slight chance of dozing   Lying down to rest in the afternoon when circumstances permit High chance of dozing   Sitting and talking to someone Would never doze   Sitting quietly after a lunch without alcohol Moderate chance of dozing   In a car, while stopped for a few minutes in traffic Would never doze   [place] Score (MC) 13   [place] Score (Sleep) 13        Patient-reported         INSOMNIA SEVERITY INDEX (SHAKA)          11/20/2024    12:45 PM   Insomnia Severity Index (SHAKA)   Difficulty falling asleep 4    Difficulty staying asleep 3    Problems waking up too early 3    How SATISFIED/DISSATISFIED are you with your CURRENT sleep pattern? 4    How NOTICEABLE to others do you think your sleep problem is in terms of impairing the quality of your life? 4    How WORRIED/DISTRESSED are you about your current sleep  "problem? 4    To what extent do you consider your sleep problem to INTERFERE with your daily functioning (e.g. daytime fatigue, mood, ability to function at work/daily chores, concentration, memory, mood, etc.) CURRENTLY? 4    SHAKA Total Score 26        Patient-reported       Guidelines for Scoring/Interpretation:  Total score categories:  0-7 = No clinically significant insomnia   8-14 = Subthreshold insomnia   15-21 = Clinical insomnia (moderate severity)  22-28 = Clinical insomnia (severe)  Used via courtesy of www.Predictryth.va.gov with permission from Jamir Rivera PhD., [facility]      STOP BANG           11/20/2024     1:00 PM   STOP BANG Questionnaire (  2008, the American Society of Anesthesiologists, Inc. [employer]   Neck Cir (cm) Clinic: 43 cm   B/P Clinic: 117/81   BMI Clinic: 47.62         GAD7        10/15/2024    10:04 AM   GARRETT-7    1. Feeling nervous, anxious, or on edge 2    2. Not being able to stop or control worrying 0    3. Worrying too much about different things 1    4. Trouble relaxing 3    5. Being so restless that it is hard to sit still 3    6. Becoming easily annoyed or irritable 3    7. Feeling afraid, as if something awful might happen 1    GARRETT-7 Total Score 13   If you checked any problems, how difficult have they made it for you to do your work, take care of things at home, or get along with other people? Somewhat difficult        Patient-reported         CAGE-AID         No data to display                CAGE-AID reprinted with permission from the [place] Medical Journal, JOSE ALFREDO Varghese. and AGUILA Berkowitz, \"Conjoint screening questionnaires for alcohol and drug abuse\" [place] Medical Journal 94: 135-140, 1995.      PATIENT HEALTH QUESTIONNAIRE-9 (PHQ - 9)         No data to display                Developed by Rustam Brennan, Stacey Suarez, Iam Aragon and colleagues, with an educational harish from [employer] No permission required to " reproduce, translate, display or distribute.        Allergies:    Allergies   Allergen Reactions    Dust Mite Extract Shortness Of Breath    Ibuprofen Anaphylaxis and Other (See Comments)     Comment: Anaphylaxis, Description:    No Clinical Screening - See Comments Shortness Of Breath    Seafood Anaphylaxis     Shellfish    Shellfish Allergy Anaphylaxis    Lithium Other (See Comments)     Psychiatric, urinary, dermatologic side effects    Sulfa Antibiotics Hives and Other (See Comments)     Comment: Hives/Skin Rash, Description:    Sulfanilamide Other (See Comments)     Comment: Hives/Skin Rash, Description:       Medications:    Current Outpatient Medications   Medication Sig Dispense Refill    ARIPiprazole (ABILIFY) 20 MG tablet Take 1 tablet by mouth daily at 2 pm.      busPIRone (BUSPAR) 10 MG tablet Take 10 mg by mouth 3 times daily.      cyclobenzaprine (FLEXERIL) 10 MG tablet Take 10 mg by mouth.      JENCYCLA 0.35 MG tablet take 1 tablet by mouth at the same time each day, preferably either after the evening meal or at bedtime.*      omeprazole (PRILOSEC) 20 MG DR capsule Take 1 capsule (20 mg) by mouth 2 times daily. 60 capsule 0    QUEtiapine (SEROQUEL XR) 150 MG TB24 24 hr tablet Take 1 tablet (150 mg) by mouth at bedtime 30 tablet 0    QUEtiapine (SEROQUEL) 25 MG tablet TAKE 1/2 TO 1 (ONE-HALF TO ONE) TABLET BY MOUTH TWICE DAILY AS NEEDED      SUMAtriptan (IMITREX) 25 MG tablet Take one tab at the start of a migraine headache.  May repeat in 1-2 hours and every 4-6 hours as needed up to 8 in 24 hours.      traZODone (DESYREL) 50 MG tablet Take 50 mg by mouth at bedtime.         Problem List:  Patient Active Problem List    Diagnosis Date Noted    Bipolar mixed affective disorder, moderate (H) 08/07/2024     Priority: Medium    GARRETT (generalized anxiety disorder) 07/17/2024     Priority: Medium    PTSD (post-traumatic stress disorder) 07/17/2024     Priority: Medium    Bipolar 1 disorder (H) 07/11/2024      Priority: Medium    Cannabis use disorder, mild, abuse 03/22/2024     Priority: Medium    AISLINN (obstructive sleep apnea) 06/08/2021     Priority: Medium    Borderline personality disorder (H) 11/13/2017     Priority: Medium     Formatting of this note might be different from the original.   Diagnosed at [facility] 2017      Lesion of plantar nerve 06/17/2013     Priority: Medium    ADHD (attention deficit hyperactivity disorder), inattentive type 08/10/2012     Priority: Medium    Liver lesion, right lobe 08/10/2012     Priority: Medium    Benign neoplasm of scalp and skin of neck 09/16/2003     Priority: Medium    Head injury 09/08/2003     Priority: Medium    Thoracic and lumbosacral neuritis 05/30/2003     Priority: Medium        Past Medical/Surgical History:  No past medical history on file.  No past surgical history on file.    Social History:  Social History     Socioeconomic History    Marital status:      Spouse name: Not on file    Number of children: Not on file    Years of education: Not on file    Highest education level: Not on file   Occupational History    Not on file   Tobacco Use    Smoking status: Never     Passive exposure: Never    Smokeless tobacco: Never   Vaping Use    Vaping status: Every Day    Substances: Nicotine, Flavoring    Devices: Disposable   Substance and Sexual Activity    Alcohol use: Yes     Comment: 1 per month    Drug use: Yes     Frequency: 14.0 times per week     Types: Marijuana     Comment: 2x per day    Sexual activity: Yes     Partners: Male     Birth control/protection: Condom, Injection   Other Topics Concern    Not on file   Social History Narrative    Not on file     Social Drivers of Health     Financial Resource Strain: Low Risk  (8/6/2024)    [facility]    Financial Resource Strain     Difficulty of Paying Living Expenses: 3     Difficulty of Paying Living Expenses: Not on file   Food Insecurity: No Food  Insecurity (8/6/2024)    [facility]    Food Insecurity     Do you worry your food will run out before you are able to buy more?: 1   Transportation Needs: No Transportation Needs (8/6/2024)    Received from [facility]    Transportation Needs     Does lack of transportation keep you from medical appointments?: 1     Does lack of transportation keep you from work, meetings or getting things that you need?: 1   Physical Activity: Sufficiently Active (5/23/2019)    Received from [facility]    Exercise Vital Sign     Days of Exercise per Week: 7 days     Minutes of Exercise per Session: 30 min   Stress: Stress Concern Present (5/23/2019)    Received from [facility]    [facility] - Occupational Stress Questionnaire     Feeling of Stress : Very much   Social Connections: Socially Isolated (8/6/2024)    Received from [facility]    Social Connections     Do you often feel lonely or isolated from those around you?: 4   Interpersonal Safety: Low Risk  (7/11/2024)    Interpersonal Safety     Do you feel physically and emotionally safe where you currently live?: Yes     Within the past 12 months, have you been hit, slapped, kicked or otherwise physically hurt by someone?: No     Within the past 12 months, have you been humiliated or emotionally abused in other ways by your partner or ex-partner?: No   Housing Stability: Low Risk  (8/6/2024)    Received from [facility]    Housing Stability     What is your housing situation today?: 1       Family History:  Family History   Problem Relation Age of Onset    Hypertension Mother     Hyperlipidemia Mother     Coronary Artery Disease Father        Review of Systems:  A complete review of systems reviewed by me is negative with the exeption of what has been mentioned in the history of present  "illness.        Physical Examination:  Vitals: /81   Pulse 75   Ht 1.66 m (5' 5.35\")   Wt 131.2 kg (289 lb 4.8 oz)   SpO2 97%   BMI 47.62 kg/m    BMI= Body mass index is 47.62 kg/m .    Neck Cir (cm): 43 cm    Physical Exam   Constitutional: She appears healthy. No distress.   HENT:   Nose: Nose normal. No nasal discharge. Mouth/Throat: Dental caries present. Oropharynx is clear.   Eyes: Conjunctivae are normal.   Pulmonary/Chest: Effort normal.   Musculoskeletal:         General: Normal range of motion.      Cervical back: Normal range of motion and neck supple.   Neurological: She is alert and oriented to person, place, and time.   Skin: Skin is warm and dry.            All Labs Personally Reviewed               Data: All pertinent previous laboratory data reviewed     Recent Labs   Lab Test 09/12/24  1442 07/11/24  0933    140   POTASSIUM 4.1 4.0   CHLORIDE 104 106   CO2 27 28   ANIONGAP 8 6*   GLC 89 93   BUN 18.1 9.3   CR 0.62 0.59   ROBERTO CARLOS 9.2 8.7       Recent Labs   Lab Test 09/12/24  1442   WBC 12.6*   RBC 4.49   HGB 13.0   HCT 39.6   MCV 88   MCH 29.0   MCHC 32.8   RDW 12.3          Recent Labs   Lab Test 09/12/24  1442   PROTTOTAL 7.4   ALBUMIN 3.9   BILITOTAL 0.2   ALKPHOS 59   AST 21   ALT 25       TSH (uIU/mL)   Date Value   07/11/2024 0.74       No results found for: \"UAMP\", \"UBARB\", \"BENZODIAZEUR\", \"UCANN\", \"UCOC\", \"OPIT\", \"UPCP\"    Iron Sat Index   Date/Time Value Ref Range Status   07/11/2024 09:33 AM 10 (L) 15 - 46 % Final     Ferritin   Date/Time Value Ref Range Status   07/11/2024 09:33 AM 44 6 - 175 ng/mL Final       No results found for: \"PH\", \"PHARTERIAL\", \"PO2\", \"YD4JRUELAXS\", \"SAT\", \"PCO2\", \"HCO3\", \"BASEEXCESS\", \"KATIE\", \"BEB\"    @LABRCNTIPR(phv:4,pco2v:4,po2v:4,hco3v:4,paige:4,o2per:4)@    Echocardiology: No results found for this or any previous visit (from the past 4320 hours).        Chest x-ray: No results found for this or any previous visit from the past 365 " days.      Chest CT: No results found for this or any previous visit from the past 365 days.      PFT: Most Recent [facility]ze Pulmonary Function Testing        DEMOND Kenney CNP 11/20/2024   Sleep Medicine

## 2024-11-21 NOTE — PATIENT INSTRUCTIONS
Drive Safe... Drive Alive     Sleep health profoundly affects your health, mood, and your safety. 33% of the population (one in three of us) is not getting enough sleep and many have a sleep disorder. Not getting enough sleep or having an untreated / undertreated sleep condition may make us sleepy without even knowing it. In fact, our driving could be dramatically impaired due to our sleep health. As your provider, here are some things I would like you to know about driving:     Here are some warning signs for impairment and dangerous drowsy driving:              -Having been awake more than 16 hours               -Looking tired               -Eyelid drooping              -Head nodding (it could be too late at this point)              -Driving for more than 30 minutes     Some things you could do to make the driving safer if you are experiencing some drowsiness:              -Stop driving and rest              -Call for transportation              -Make sure your sleep disorder is adequately treated     Some things that have been shown NOT to work when experiencing drowsiness while driving:              -Turning on the radio              -Opening windows              -Eating any  distracting  /  entertaining  foods (e.g., sunflower seeds, candy, or any other)              -Talking on the phone      Your decision may not only impact your life, but also the life of others. Please, remember to drive safe for yourself and all of us.   Your BMI is Body mass index is 47.62 kg/m .    What is BMI?  Body mass index (BMI) is one way to tell whether you are at a healthy weight, overweight, or obese. It measures your weight in relation to your height.  A BMI of 18.5 to 24.9 is in the healthy range. A person with a BMI of 25 to 29.9 is considered overweight, and someone with a BMI of 30 or greater is considered obese.  Another way to find out if you are at risk for health problems caused by overweight and obesity is to measure  your waist. If you are a woman and your waist is more than 35 inches, or if you are a man and your waist is more than 40 inches, your risk of disease may be higher.  More than two-thirds of American adults are considered overweight or obese. Being overweight or obese increases the risk for further weight gain.  Excess weight may lead to heart disease and diabetes. Creating and following plans for healthy eating and physical activity may help you improve your health.    Methods for maintaining or losing weight.  Weight control is part of healthy lifestyle and includes exercise, emotional health, and healthy eating habits.  Careful eating habits lifelong is the mainstay of weight control.  Though there are significant health benefits from weight loss, long-term weight loss with diet alone may be very difficult to achieve- studies show long-term success with dietary management in less than 10% of people. Attaining a healthy weight may be especially difficult to achieve in those with severe obesity. In some cases, medications, devices and surgical management might be considered.    What can you do?  If you are overweight or obese and are interested in methods for weight loss, you should discuss this with your provider. In addition, we recommend that you review healthy life styles and methods for weight loss available through the National Institutes of Health patient information sites:   http://win.niddk.nih.gov/publications/index.htm

## 2024-12-05 ENCOUNTER — DOCUMENTATION ONLY (OUTPATIENT)
Dept: SLEEP MEDICINE | Facility: CLINIC | Age: 39
End: 2024-12-05
Payer: COMMERCIAL

## 2024-12-05 ENCOUNTER — OFFICE VISIT (OUTPATIENT)
Dept: URGENT CARE | Facility: URGENT CARE | Age: 39
End: 2024-12-05
Payer: COMMERCIAL

## 2024-12-05 VITALS
BODY MASS INDEX: 48.07 KG/M2 | DIASTOLIC BLOOD PRESSURE: 85 MMHG | OXYGEN SATURATION: 98 % | WEIGHT: 292 LBS | HEART RATE: 69 BPM | SYSTOLIC BLOOD PRESSURE: 135 MMHG | RESPIRATION RATE: 18 BRPM | TEMPERATURE: 97.5 F

## 2024-12-05 DIAGNOSIS — G89.29 CHRONIC BILATERAL LOW BACK PAIN WITH RIGHT-SIDED SCIATICA: Primary | ICD-10-CM

## 2024-12-05 DIAGNOSIS — G47.33 OBSTRUCTIVE SLEEP APNEA: Primary | ICD-10-CM

## 2024-12-05 DIAGNOSIS — M54.41 CHRONIC BILATERAL LOW BACK PAIN WITH RIGHT-SIDED SCIATICA: Primary | ICD-10-CM

## 2024-12-05 RX ORDER — METHYLPREDNISOLONE 4 MG/1
TABLET ORAL
Qty: 21 TABLET | Refills: 0 | Status: SHIPPED | OUTPATIENT
Start: 2024-12-05

## 2024-12-05 ASSESSMENT — PAIN SCALES - GENERAL: PAINLEVEL_OUTOF10: EXTREME PAIN (8)

## 2024-12-05 NOTE — PROGRESS NOTES
Assessment & Plan     Chronic bilateral low back pain with right-sided sciatica    - methylPREDNISolone (MEDROL DOSEPAK) 4 MG tablet therapy pack  Dispense: 21 tablet; Refill: 0     For acute on chronic back pain patient states a steroid works best for her and prescription sent to pharmacy for medrol dosepack, avoid NSAIDs while taking, Tylenol 1000 mg every 8 hours as needed. Rest, walking, gentle stretching, ice, heat, salonpas over the counter patch or cream.    Schedule PT recommended. Keep spine appt. No red flag signs currently. Neurologically stable currently.     Follow-up with PCP if symptoms persist for 7 days, and sooner if symptoms worsen or new symptoms develop.     Discussed red flag symptoms which warrant immediate visit in emergency room    All questions were answered and patient verbalized understanding. AVS sent via [employer] Donald, GAYLE, APRN, CNP 12/5/2024 2:14 PM  [facility] [place]    Lindsay Clement is a 38 year old female who presents to clinic today for the following health issues:  Chief Complaint   Patient presents with    Urgent Care    Musculoskeletal Problem     Per patient states for the past two days she has been having bilateral low back pain worst on the right side radiates up back and down to leg          12/5/2024     1:07 PM   Additional Questions   Roomed by NATALIE Lizama   Accompanied by Self       Back Pain    Onset of symptoms was 2 day(s) ago.  Location: bilateral low back  Radiation: radiates into the right buttocks and radiates into the right leg  Context: No known injury  Course of symptoms is same.    Severity severe  Current and Associated symptoms: pain, intermittent right calf numbness, not currently.   Denies: fecal incontinence, urinary incontinence, lower extremity weakness, and paresthesia    Aggravating Factors: changing position  Therapies to improve symptoms include: Aleve twice daily and tylenol help temporarily. Ice doesn't help.  Left over muscle relaxant only helped temporarily.   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   Sees spine doctor 12/20/24. Hasn't scheduled PT.  No history of kidney or liver disease    Problem list, Medication list, Allergies, and Medical history reviewed in EPIC.    ROS:  Review of systems negative except for noted above        Objective    /85   Pulse 69   Temp 97.5  F (36.4  C) (Tympanic)   Resp 18   Wt 132.5 kg (292 lb)   SpO2 98%   BMI 48.07 kg/m    Physical Exam  Constitutional:       General: She is not in acute distress.     Appearance: She is obese. She is not toxic-appearing or diaphoretic.   Musculoskeletal:      Comments: No spinal or paraspinal tenderness with palpation. Decreased lumbar flexion and extension. Negative straight leg raise bilaterally   Neurological:      Mental Status: She is alert.      Sensory: No sensory deficit.      Motor: No weakness.      Gait: Gait normal.      Comments: Able to walk on toes, heels, straight leg raise. Changing positions slowly

## 2024-12-05 NOTE — PROGRESS NOTES
Patient was offered choice of vendor and chose [facility].  Patient Racquel Aguilar was set up at [facility] on December 5, 2024. Patient received a Resmed Airsense 10 Pressures were set at  6-18 cm H2O.   Patient s ramp is 5 cm H2O for Auto and FLEX/EPR is 2.  Patient received a [employer] Mask name: DREAMWISP NASAL  Nasal mask size Standard, heated tubing and heated humidifier.  Patient has the following compliance requirements: using and visit requirements  Patient has a follow up on 2/28/2024 with Page Pinto CNP.    Justine Cazares

## 2024-12-05 NOTE — PATIENT INSTRUCTIONS
Rest, walking, gentle stretching, ice, heat, salonpas over the counter patch or cream, tylenol as needed.

## 2024-12-09 ENCOUNTER — DOCUMENTATION ONLY (OUTPATIENT)
Dept: SLEEP MEDICINE | Facility: CLINIC | Age: 39
End: 2024-12-09
Payer: COMMERCIAL

## 2024-12-09 NOTE — PROGRESS NOTES
3 day Sleep therapy management telephone visit    Diagnostic AHI:       Confirmed with patient at time of call- Yes Patient is still interested in STM service       Subjective measures:  Spoke  with patient she is feeling the benefit of CPAP she is feeling more rested and has more energy. She has no questions or concerns.         Objective data     Order Settings for PAP  CPAP min     CPAP max     CPAP fixed         Device settings from machine CPAP min 6.0     CPAP max 18.0      CPAP fixed      EPR Setting TWO    RESMED soft response  OFF     Assessment: Nightly usage over four hours      Patient has the following upcoming sleep appts:  Future Sleep Appointments         Provider Department    2/28/2025 8:30 AM (Arrive by 8:15 AM) Page iPnto APRN [facility]            Replacement device: No  STM ordered by provider: Yes     Total time spent on accessing and  interpreting remote patient PAP therapy data  10 minutes    Total time spent counseling, coaching  and reviewing PAP therapy data with patient  3 minutes    [id number] no

## 2024-12-16 ENCOUNTER — PATIENT OUTREACH (OUTPATIENT)
Dept: CARE COORDINATION | Facility: CLINIC | Age: 39
End: 2024-12-16

## 2024-12-19 ENCOUNTER — PRE VISIT (OUTPATIENT)
Dept: SURGERY | Facility: CLINIC | Age: 39
End: 2024-12-19

## 2024-12-23 NOTE — TELEPHONE ENCOUNTER
Diagnosis, Referred by & from: External Hemorrhoids   Appt date: 3/13/2025   NOTES STATUS DETAILS   OFFICE NOTE from referring provider [facility]:  9/12/24 - PCC OV with RENATA Hester   OFFICE NOTE from other specialist Care Everywhere / [facility]:  8/22/24 - UC OV with Jeni Contreras NP     [facility]:  7/17/24, 7/11/24 - PCC OV with RENATA Powell     [facility]:  5/21/18 - OBGYN OV with Dr. Esparza   DISCHARGE SUMMARY from hospital N/A    DISCHARGE REPORT from the ER Care Everywhere [facility]:  4/6/24 - ED OV with Dr. Terry   OPERATIVE REPORT N/A    MEDICATION LIST Care Everywhere    LABS     ANAL PAP/CEA N/A    BIOPSIES/PATHOLOGY RELATED TO DIAGNOSIS N/A    DIAGNOSTIC PROCEDURES     PFC TESTING (from the Pelvic floor center includes Manometry, PDNL, EMG, etc.) N/A    COLONOSCOPY (most recent all time after 5 years) N/A    FLEX SIGMOIDOSCOPY N/A    UPPER ENDOSCOPY (EGD) N/A    ERCP N/A    IMAGING (DISC & REPORT)      CT N/A    MRI N/A    XRAY Internal [facility]:  9/12/24 - XR Abdomen   ULTRASOUND  (ENDOANAL/ENDORECTAL) N/A

## 2025-01-14 ENCOUNTER — ALLIED HEALTH/NURSE VISIT (OUTPATIENT)
Dept: FAMILY MEDICINE | Facility: CLINIC | Age: 40
End: 2025-01-14
Payer: COMMERCIAL

## 2025-01-14 DIAGNOSIS — Z30.42 ENCOUNTER FOR SURVEILLANCE OF INJECTABLE CONTRACEPTIVE: Primary | ICD-10-CM

## 2025-01-14 PROCEDURE — 99207 PR NO CHARGE NURSE ONLY: CPT

## 2025-01-14 RX ADMIN — MEDROXYPROGESTERONE ACETATE 150 MG: 150 INJECTION, SUSPENSION INTRAMUSCULAR at 12:06

## 2025-02-20 ENCOUNTER — TELEPHONE (OUTPATIENT)
Dept: FAMILY MEDICINE | Facility: CLINIC | Age: 40
End: 2025-02-20

## 2025-02-20 ENCOUNTER — MYC MEDICAL ADVICE (OUTPATIENT)
Dept: FAMILY MEDICINE | Facility: CLINIC | Age: 40
End: 2025-02-20

## 2025-02-20 ENCOUNTER — VIRTUAL VISIT (OUTPATIENT)
Dept: FAMILY MEDICINE | Facility: CLINIC | Age: 40
End: 2025-02-20
Payer: COMMERCIAL

## 2025-02-20 DIAGNOSIS — F31.9 BIPOLAR 1 DISORDER (H): ICD-10-CM

## 2025-02-20 DIAGNOSIS — E66.813 CLASS 3 SEVERE OBESITY DUE TO EXCESS CALORIES WITH SERIOUS COMORBIDITY AND BODY MASS INDEX (BMI) OF 45.0 TO 49.9 IN ADULT (H): Primary | ICD-10-CM

## 2025-02-20 DIAGNOSIS — E66.01 CLASS 3 SEVERE OBESITY DUE TO EXCESS CALORIES WITH SERIOUS COMORBIDITY AND BODY MASS INDEX (BMI) OF 45.0 TO 49.9 IN ADULT (H): Primary | ICD-10-CM

## 2025-02-20 NOTE — TELEPHONE ENCOUNTER
Retail Pharmacy Prior Authorization Team   Phone: [phone]    PRIOR AUTHORIZATION DENIED    Medication: ZEPBOUND 2.5 MG/0.5ML SC SOAJ  Insurance Company: [employer] [place] - Phone [phone] Fax [phone]  Denial Date: 2/20/2025  Denial Reason(s): WEIGHT LOSS DRUGS ARE EXCLUDED FROM COVERAGE  Appeal Information: N/A  Patient Notified: NO

## 2025-02-20 NOTE — PROGRESS NOTES
"Racquel is a 39 year old who is being evaluated via a billable telephone visit.    What phone number would you like to be contacted at? [phone]  How would you like to obtain your AVS? [place]  Originating Location (pt. Location): Home    Distant Location (provider location):  On-site  Telephone visit completed due to the patient did not have access to video, while the distant provider did.    Assessment & Plan     Class 3 severe obesity due to excess calories with serious comorbidity and body mass index (BMI) of 45.0 to 49.9 in adult (H)  -Not a candidate for phentermine and bupropion due to risk of jimmy with bipolar disorder.   - Comprehensive metabolic panel; Future  - Hemoglobin A1c; Future  - TSH with free T4 reflex; Future  - Vitamin D Deficiency; Future  - CBC with Platelets & Differential; Future  - Lipid panel reflex to direct LDL Non-fasting; Future  - tirzepatide-Weight Management (ZEPBOUND) 2.5 MG/0.5ML prefilled pen; Inject 0.5 mLs (2.5 mg) subcutaneously every 7 days for 4 doses.  -Adult nutrition  referral    Bipolar 1 disorder (H)  -Not currently experiencing depression or manic symptoms, states she is functioning well at home and at work. Discussed increased risk of mood swings off medications. Recommend following up immediately if starting to experience this.           BMI  Estimated body mass index is 48.07 kg/m  as calculated from the following:    Height as of 11/20/24: 1.66 m (5' 5.35\").    Weight as of 12/5/24: 132.5 kg (292 lb).   Weight management plan: Discussed healthy diet and exercise guidelines tirzepatide      FUTURE LABS:       - Schedule a non-fasting blood draw   FUTURE APPOINTMENTS:       - Follow-up visit in 3 months    Subjective   Racquel is a 39 year old, presenting for the following health issues:  weight loss management        2/20/2025     7:49 AM   Additional Questions   Roomed by Nancy LOWERY   Accompanied by self     Was feeling really robotic on her medications " (Abilify) now controlling her moods with diet, exercise, working more. Sleeping well, using her CPAP which she says made a world' difference. Stopped all her meds around December.     No history of binge eating. Does struggle with sweets. States she is taking a night time medication that helps with cravings that she got off of [employer].     History of Present Illness       Reason for visit:  Discuss weight loss meds                    Objective           Vitals:  No vitals were obtained today due to virtual visit.    Physical Exam   General: Alert and no distress //Respiratory: No audible wheeze, cough, or shortness of breath // Psychiatric:  Appropriate affect, tone, and pace of words            Phone call duration: 17 minutes  Signed Electronically by: DEMOND Preston CNP

## 2025-03-04 DIAGNOSIS — E66.813 CLASS 3 SEVERE OBESITY DUE TO EXCESS CALORIES WITH SERIOUS COMORBIDITY AND BODY MASS INDEX (BMI) OF 45.0 TO 49.9 IN ADULT (H): ICD-10-CM

## 2025-03-07 ENCOUNTER — ANCILLARY PROCEDURE (OUTPATIENT)
Dept: GENERAL RADIOLOGY | Facility: CLINIC | Age: 40
End: 2025-03-07
Attending: PHYSICIAN ASSISTANT
Payer: COMMERCIAL

## 2025-03-07 PROCEDURE — 71046 X-RAY EXAM CHEST 2 VIEWS: CPT | Mod: TC | Performed by: RADIOLOGY

## 2025-03-13 ENCOUNTER — PRE VISIT (OUTPATIENT)
Dept: SURGERY | Facility: CLINIC | Age: 40
End: 2025-03-13

## 2025-03-25 NOTE — LETTER
July 11, 2024      Racquel HINTON Jeff  [address]   [facility] [address]        To Whom It May Concern:    Racquel Aguilar  was seen on 7/11/2024.  Please excuse her until 7/15/2024.       Sincerely,        Racquel Armas PA-C    
pmd